# Patient Record
Sex: FEMALE | Race: WHITE | NOT HISPANIC OR LATINO | Employment: FULL TIME | URBAN - METROPOLITAN AREA
[De-identification: names, ages, dates, MRNs, and addresses within clinical notes are randomized per-mention and may not be internally consistent; named-entity substitution may affect disease eponyms.]

---

## 2019-03-09 ENCOUNTER — OFFICE VISIT (OUTPATIENT)
Dept: URGENT CARE | Facility: CLINIC | Age: 44
End: 2019-03-09
Payer: COMMERCIAL

## 2019-03-09 VITALS
DIASTOLIC BLOOD PRESSURE: 75 MMHG | OXYGEN SATURATION: 99 % | HEIGHT: 67 IN | RESPIRATION RATE: 18 BRPM | HEART RATE: 76 BPM | SYSTOLIC BLOOD PRESSURE: 118 MMHG | TEMPERATURE: 99 F | BODY MASS INDEX: 23.54 KG/M2 | WEIGHT: 150 LBS

## 2019-03-09 DIAGNOSIS — Z76.89 ENCOUNTER TO ESTABLISH CARE WITH NEW DOCTOR: ICD-10-CM

## 2019-03-09 DIAGNOSIS — J45.901 EXACERBATION OF ASTHMA, UNSPECIFIED ASTHMA SEVERITY, UNSPECIFIED WHETHER PERSISTENT: Primary | ICD-10-CM

## 2019-03-09 PROCEDURE — 99203 OFFICE O/P NEW LOW 30 MIN: CPT | Mod: S$GLB,,, | Performed by: FAMILY MEDICINE

## 2019-03-09 PROCEDURE — 99203 PR OFFICE/OUTPT VISIT, NEW, LEVL III, 30-44 MIN: ICD-10-PCS | Mod: S$GLB,,, | Performed by: FAMILY MEDICINE

## 2019-03-09 PROCEDURE — 3008F BODY MASS INDEX DOCD: CPT | Mod: CPTII,S$GLB,, | Performed by: FAMILY MEDICINE

## 2019-03-09 PROCEDURE — 3008F PR BODY MASS INDEX (BMI) DOCUMENTED: ICD-10-PCS | Mod: CPTII,S$GLB,, | Performed by: FAMILY MEDICINE

## 2019-03-09 RX ORDER — PROMETHAZINE HYDROCHLORIDE AND CODEINE PHOSPHATE 6.25; 1 MG/5ML; MG/5ML
SOLUTION ORAL
COMMUNITY
Start: 2019-02-06 | End: 2020-12-18

## 2019-03-09 RX ORDER — ALBUTEROL SULFATE 90 UG/1
1-2 AEROSOL, METERED RESPIRATORY (INHALATION) EVERY 4 HOURS PRN
Qty: 18 G | Refills: 0 | Status: SHIPPED | OUTPATIENT
Start: 2019-03-09 | End: 2021-08-02

## 2019-03-09 RX ORDER — PREDNISONE 20 MG/1
40 TABLET ORAL DAILY
Qty: 10 TABLET | Refills: 0 | Status: SHIPPED | OUTPATIENT
Start: 2019-03-09 | End: 2019-03-14

## 2019-03-09 RX ORDER — ALBUTEROL SULFATE 90 UG/1
AEROSOL, METERED RESPIRATORY (INHALATION)
COMMUNITY
Start: 2019-02-06 | End: 2019-03-09 | Stop reason: SDUPTHER

## 2019-03-09 RX ORDER — NORGESTIMATE AND ETHINYL ESTRADIOL 7DAYSX3 LO
KIT ORAL
COMMUNITY
Start: 2019-02-02 | End: 2019-12-11 | Stop reason: SDUPTHER

## 2019-03-09 NOTE — PATIENT INSTRUCTIONS
PLEASE READ YOUR DISCHARGE INSTRUCTIONS ENTIRELY AS IT CONTAINS IMPORTANT INFORMATION.      Please take your steroids to completion.   You received a steroid today - this can elevate your blood pressure, elevate your blood sugar, water weight gain, nervous energy, redness to the face and dimpling of the skin where the shot goes in if you had an injection.   Do not use steroids more than 3 times per year.   If you have diabetes, please check you blood sugar frequently.  If you have high blood pressure, please check your blood pressure frequently.     Use the albuterol inhaler for wheezing.         Please return or see your primary care doctor if you develop new or worsening symptoms.   A referral to primary care has been placed for you. You will be contacted in the next day or two about scheduling an appointment. Please call [phone] if you are not contacted.       Please arrange follow up with your primary medical clinic as soon as possible. You must understand that you've received an Urgent Care treatment only and that you may be released before all of your medical problems are known or treated. You, the patient, will arrange for follow up as instructed. If your symptoms worsen or fail to improve you should go to the Emergency Room.    Discharge Instructions for Asthma  You have been diagnosed with an asthma attack. With the help of your healthcare provider, you can keep your asthma under control and have less emergency department visits and stays in the hospital.    Managing asthma  · Take your asthma medicines exactly as your provider tells you. Do this even if you feel that your athma is under control.  · Learn how to monitor your asthma. Some people watch for early changes of symptoms getting worse. Some use a peak flow meter. Your healthcare provider may decide to give you an asthma action plan.  · Be sure to always have a quick-relief inhaler with you. If you were given a prescription, make sure you go  to a pharmacy to get it filled as soon as possible.  Controlling asthma triggers  Triggers are those things that make your asthma symptoms worse or cause asthma attacks. Many people with asthma have allergies that can be triggers. Your healthcare provider may have you get allergy testing to find out what you are allergic to. This can help you stay away from triggers.  Dust or dust mites are a common asthma trigger. To avoid a dust mites, do the following:  · Use dust-proof covers on your mattress and pillows. Wash the sheets and blankets on your bed once a week in very hot water.  · Dont sleep or lie on cloth-covered cushions or furniture.  · Ask someone else to vacuum and dust your house.  · If you do vacuum and dust yourself, wear a dust mask. You can buy them from the [employer] store.  · Use a vacuum with a double-layered bag or HEPA (high-efficiency particulate air) filter.  Pets with fur or feathers are triggers for some people. If you must have pets, take these precautions:  · Keep pets out of your bedroom and off your bed. Keep the bedroom door closed.  · Cover the air vents in your bedroom with heavy material to filter the air.  · Don't use carpets or cloth-covered furniture in your home. If this is not possible, keep pets out of rooms with these items.  · Have someone bathe your pets every week. And brush them often.  If you smoke, do your best to quit.  · Enroll in a stop-smoking program to increase your chance of success.  · Ask your healthcare provider about medicines or other methods to help you quit.  · Ask family members to quit smoking as well.  · Dont allow anyone to smoke in your home, in your car, or around you.  Other steps to take  · Make sure you know what to do if exercise is a trigger for you. Many people use quick-relief inhalers before exercise or physical activity.  · Get a flu shot every year and get pneumonia shots as advised by your healthcare provider.  · Try to keep your windows  closed during pollen seasons and when mold counts are high.  · On cold or windy days, cover your nose and mouth with a scarf.  · Try to stay away from people who are sick with colds or the flu. Wash your hands often or use a hand . If respiratory infections like colds or flu trigger your asthma, use your quick-relief medicines as soon as you begin to notice respiratory symptoms. They may include a runny or stuffy nose, sore throat, or a cough.  Follow-up care  Make a follow-up appointment as directed by our staff. Follow your asthma action plan if you were given one.  When to seek medical attention  Call 911 right away if you have:  · Severe wheezing  · Shortness of breath that is not relieved by your quick-relief medication  · Trouble walking or talking because of shortness of breath  · Blue lips or fingernails  · If you monitor symptoms with a peak flow meter, readings less than 50% of your personal best   Date Last Reviewed: 2/3/2017  © 4093-1929 [employer]. [address] [place] [address], [place] [address]. All rights reserved. This information is not intended as a substitute for professional medical care. Always follow your healthcare professional's instructions.

## 2019-03-09 NOTE — PROGRESS NOTES
"Subjective:       Patient ID: Janae Soto is a 43 y.o. female.    Vitals:  height is 5' 7" (1.702 m) and weight is 68 kg (150 lb). Her temperature is 98.7 °F (37.1 °C). Her blood pressure is 118/75 and her pulse is 76. Her respiration is 18 and oxygen saturation is 99%.     Chief Complaint: Cough    Patient needs a refill for inhaler - had asthma more so when she was younger, it has been controlled into her adult years but a month ago she noticed wheezing and coughing, she went to [facility] and was given inhaler, neb treatment that helped greatly. Cough and wheezing still lingering on. Using albuterol inhaler 10-11 times per day states it does help though. Feel sob at times also. No fever.       Cough   This is a new problem. The current episode started 1 to 4 weeks ago. The problem has been gradually worsening. Associated symptoms include shortness of breath and wheezing. Pertinent negatives include no chills, ear pain, eye redness, fever, hemoptysis, myalgias, rash or sore throat. Nothing aggravates the symptoms.       Constitution: Negative for chills, sweating, fatigue and fever.   HENT: Negative for ear pain, congestion, sinus pain, sinus pressure, sore throat and voice change.    Neck: Negative for painful lymph nodes.   Eyes: Negative for eye redness.   Respiratory: Positive for chest tightness, cough, shortness of breath and wheezing. Negative for sputum production, bloody sputum, COPD, stridor and asthma.    Gastrointestinal: Negative for nausea and vomiting.   Musculoskeletal: Negative for muscle ache.   Skin: Negative for rash.   Allergic/Immunologic: Negative for seasonal allergies and asthma.   Hematologic/Lymphatic: Negative for swollen lymph nodes.       Objective:      Physical Exam   Constitutional: She is oriented to person, place, and time. She appears well-developed and well-nourished. She is cooperative.  Non-toxic appearance. She does not appear ill. No distress.   HENT:   Head: " Normocephalic and atraumatic.   Right Ear: Hearing, tympanic membrane, external ear and ear canal normal.   Left Ear: Hearing, tympanic membrane, external ear and ear canal normal.   Nose: Nose normal. No mucosal edema, rhinorrhea or nasal deformity. No epistaxis. Right sinus exhibits no maxillary sinus tenderness and no frontal sinus tenderness. Left sinus exhibits no maxillary sinus tenderness and no frontal sinus tenderness.   Mouth/Throat: Uvula is midline, oropharynx is clear and moist and mucous membranes are normal. No trismus in the jaw. Normal dentition. No uvula swelling. No posterior oropharyngeal erythema.   Eyes: Conjunctivae and lids are normal. No scleral icterus.   Sclera clear bilat   Neck: Trachea normal, full passive range of motion without pain and phonation normal. Neck supple.   Cardiovascular: Normal rate, regular rhythm, normal heart sounds, intact distal pulses and normal pulses.   Pulmonary/Chest: Effort normal. No accessory muscle usage. No tachypnea. No respiratory distress. She has no decreased breath sounds. She has wheezes in the right upper field, the right middle field, the left upper field and the left middle field. She has no rhonchi. She has no rales.   Non productive cough present through out the exam     Abdominal: Soft. Normal appearance and bowel sounds are normal. She exhibits no distension. There is no tenderness.   Musculoskeletal: Normal range of motion. She exhibits no edema or deformity.   Neurological: She is alert and oriented to person, place, and time. She exhibits normal muscle tone. Coordination normal.   Skin: Skin is warm, dry and intact. She is not diaphoretic. No pallor.   Psychiatric: She has a normal mood and affect. Her speech is normal and behavior is normal. Judgment and thought content normal. Cognition and memory are normal.   Nursing note and vitals reviewed.      Assessment:       1. Exacerbation of asthma, unspecified asthma severity, unspecified  whether persistent    2. Encounter to establish care with new doctor        Plan:         Exacerbation of asthma, unspecified asthma severity, unspecified whether persistent  -     PROAIR HFA 90 mcg/actuation inhaler; Inhale 1-2 puffs into the lungs every 4 (four) hours as needed for Wheezing.  Dispense: 18 g; Refill: 0  -     predniSONE (DELTASONE) 20 MG tablet; Take 2 tablets (40 mg total) by mouth once daily. for 5 days  Dispense: 10 tablet; Refill: 0    Encounter to establish care with new doctor  -     Ambulatory referral to Internal Medicine          Patient Instructions     PLEASE READ YOUR DISCHARGE INSTRUCTIONS ENTIRELY AS IT CONTAINS IMPORTANT INFORMATION.      Please take your steroids to completion.   You received a steroid today - this can elevate your blood pressure, elevate your blood sugar, water weight gain, nervous energy, redness to the face and dimpling of the skin where the shot goes in if you had an injection.   Do not use steroids more than 3 times per year.   If you have diabetes, please check you blood sugar frequently.  If you have high blood pressure, please check your blood pressure frequently.     Use the albuterol inhaler for wheezing.         Please return or see your primary care doctor if you develop new or worsening symptoms.   A referral to primary care has been placed for you. You will be contacted in the next day or two about scheduling an appointment. Please call [phone] if you are not contacted.       Please arrange follow up with your primary medical clinic as soon as possible. You must understand that you've received an Urgent Care treatment only and that you may be released before all of your medical problems are known or treated. You, the patient, will arrange for follow up as instructed. If your symptoms worsen or fail to improve you should go to the Emergency Room.    Discharge Instructions for Asthma  You have been diagnosed with an asthma attack. With the help of  your healthcare provider, you can keep your asthma under control and have less emergency department visits and stays in the hospital.    Managing asthma  · Take your asthma medicines exactly as your provider tells you. Do this even if you feel that your athma is under control.  · Learn how to monitor your asthma. Some people watch for early changes of symptoms getting worse. Some use a peak flow meter. Your healthcare provider may decide to give you an asthma action plan.  · Be sure to always have a quick-relief inhaler with you. If you were given a prescription, make sure you go to a pharmacy to get it filled as soon as possible.  Controlling asthma triggers  Triggers are those things that make your asthma symptoms worse or cause asthma attacks. Many people with asthma have allergies that can be triggers. Your healthcare provider may have you get allergy testing to find out what you are allergic to. This can help you stay away from triggers.  Dust or dust mites are a common asthma trigger. To avoid a dust mites, do the following:  · Use dust-proof covers on your mattress and pillows. Wash the sheets and blankets on your bed once a week in very hot water.  · Dont sleep or lie on cloth-covered cushions or furniture.  · Ask someone else to vacuum and dust your house.  · If you do vacuum and dust yourself, wear a dust mask. You can buy them from the [employer] store.  · Use a vacuum with a double-layered bag or HEPA (high-efficiency particulate air) filter.  Pets with fur or feathers are triggers for some people. If you must have pets, take these precautions:  · Keep pets out of your bedroom and off your bed. Keep the bedroom door closed.  · Cover the air vents in your bedroom with heavy material to filter the air.  · Don't use carpets or cloth-covered furniture in your home. If this is not possible, keep pets out of rooms with these items.  · Have someone bathe your pets every week. And brush them often.  If you smoke,  do your best to quit.  · Enroll in a stop-smoking program to increase your chance of success.  · Ask your healthcare provider about medicines or other methods to help you quit.  · Ask family members to quit smoking as well.  · Dont allow anyone to smoke in your home, in your car, or around you.  Other steps to take  · Make sure you know what to do if exercise is a trigger for you. Many people use quick-relief inhalers before exercise or physical activity.  · Get a flu shot every year and get pneumonia shots as advised by your healthcare provider.  · Try to keep your windows closed during pollen seasons and when mold counts are high.  · On cold or windy days, cover your nose and mouth with a scarf.  · Try to stay away from people who are sick with colds or the flu. Wash your hands often or use a hand . If respiratory infections like colds or flu trigger your asthma, use your quick-relief medicines as soon as you begin to notice respiratory symptoms. They may include a runny or stuffy nose, sore throat, or a cough.  Follow-up care  Make a follow-up appointment as directed by our staff. Follow your asthma action plan if you were given one.  When to seek medical attention  Call 911 right away if you have:  · Severe wheezing  · Shortness of breath that is not relieved by your quick-relief medication  · Trouble walking or talking because of shortness of breath  · Blue lips or fingernails  · If you monitor symptoms with a peak flow meter, readings less than 50% of your personal best   Date Last Reviewed: 2/3/2017  © 1440-3950 [employer], [employer]. [address] [place] [address], [place] [address]. All rights reserved. This information is not intended as a substitute for professional medical care. Always follow your healthcare professional's instructions.

## 2019-03-28 ENCOUNTER — OFFICE VISIT (OUTPATIENT)
Dept: URGENT CARE | Facility: CLINIC | Age: 44
End: 2019-03-28
Payer: COMMERCIAL

## 2019-03-28 VITALS
OXYGEN SATURATION: 96 % | TEMPERATURE: 99 F | SYSTOLIC BLOOD PRESSURE: 107 MMHG | HEIGHT: 67 IN | RESPIRATION RATE: 18 BRPM | HEART RATE: 75 BPM | BODY MASS INDEX: 23.54 KG/M2 | DIASTOLIC BLOOD PRESSURE: 70 MMHG | WEIGHT: 150 LBS

## 2019-03-28 DIAGNOSIS — J45.909 ASTHMA, UNSPECIFIED ASTHMA SEVERITY, UNSPECIFIED WHETHER COMPLICATED, UNSPECIFIED WHETHER PERSISTENT: Primary | ICD-10-CM

## 2019-03-28 PROCEDURE — 3008F PR BODY MASS INDEX (BMI) DOCUMENTED: ICD-10-PCS | Mod: CPTII,S$GLB,, | Performed by: FAMILY MEDICINE

## 2019-03-28 PROCEDURE — 3008F BODY MASS INDEX DOCD: CPT | Mod: CPTII,S$GLB,, | Performed by: FAMILY MEDICINE

## 2019-03-28 PROCEDURE — 99213 OFFICE O/P EST LOW 20 MIN: CPT | Mod: 25,S$GLB,, | Performed by: FAMILY MEDICINE

## 2019-03-28 PROCEDURE — 99213 PR OFFICE/OUTPT VISIT, EST, LEVL III, 20-29 MIN: ICD-10-PCS | Mod: 25,S$GLB,, | Performed by: FAMILY MEDICINE

## 2019-03-28 PROCEDURE — 94640 AIRWAY INHALATION TREATMENT: CPT | Mod: S$GLB,,, | Performed by: FAMILY MEDICINE

## 2019-03-28 PROCEDURE — 94640 PR INHAL RX, AIRWAY OBST/DX SPUTUM INDUCT: ICD-10-PCS | Mod: S$GLB,,, | Performed by: FAMILY MEDICINE

## 2019-03-28 RX ORDER — ALBUTEROL SULFATE 0.83 MG/ML
2.5 SOLUTION RESPIRATORY (INHALATION) EVERY 6 HOURS PRN
Qty: 1 BOX | Refills: 0 | Status: SHIPPED | OUTPATIENT
Start: 2019-03-28 | End: 2020-03-27

## 2019-03-28 RX ORDER — IPRATROPIUM BROMIDE 0.5 MG/2.5ML
0.5 SOLUTION RESPIRATORY (INHALATION)
Status: COMPLETED | OUTPATIENT
Start: 2019-03-28 | End: 2019-03-28

## 2019-03-28 RX ORDER — ALBUTEROL SULFATE 0.83 MG/ML
2.5 SOLUTION RESPIRATORY (INHALATION)
Status: COMPLETED | OUTPATIENT
Start: 2019-03-28 | End: 2019-03-28

## 2019-03-28 RX ADMIN — IPRATROPIUM BROMIDE 0.5 MG: 0.5 SOLUTION RESPIRATORY (INHALATION) at 03:03

## 2019-03-28 RX ADMIN — ALBUTEROL SULFATE 2.5 MG: 0.83 SOLUTION RESPIRATORY (INHALATION) at 03:03

## 2019-03-28 NOTE — PATIENT INSTRUCTIONS
PLEASE READ YOUR DISCHARGE INSTRUCTIONS ENTIRELY AS IT CONTAINS IMPORTANT INFORMATION.    Can do the nebulizer for wheezing, cough    Please call [phone] for primary care appointment    Please follow up with primary care for further management of your asthma        You must understand that you have received an Urgent Care treatment only and that you may be released before all of your medical problems are known or treated.    Controlling Your Asthma  You can do a lot to manage your asthma and improve your quality of life. You will need to work with your healthcare provider to develop a plan. But its up to you to put this plan into action.  Why you need to take control  You need to control the inflammation in your lungs. Take all medicine as directed, especially controller medicines, even if you feel that your asthma is under good control. You also need to relieve symptoms when you have them. These are long-term tasks. But the more you stay in control, the better youll feel. If you dont stay in control:  · Asthma symptoms may cause you to miss school, work, or activities that you enjoy.  · Asthma flare-ups can be dangerous, even deadly.  · Uncontrolled asthma makes it more likely that you will need emergency department and in-hospital care.  · Uncontrolled asthma may cause permanent damage to your lungs.    Peak flow monitoring helps measure how open your airways are.   Taking medicine helps you control your asthma and relieve symptoms when they occur.     Using an Asthma Action Plan will help you keep track of and respond to asthma symptoms.   Avoiding triggers--the things that inflame your airways--will help prevent symptoms and flare-ups.   Your action plan  Your healthcare provider will help you prepare, and when needed, update your personal Asthma Action Plan. Your plan tells you what to do based on your current symptoms. If you don't have an Asthma Action Plan, or if yours isn't up-to-date, make sure you  talk with your healthcare provider.  Date Last Reviewed: 1/1/2017  © 2375-7351 [email or web address]. [address] [place] [address], [place] [address]. All rights reserved. This information is not intended as a substitute for professional medical care. Always follow your healthcare professional's instructions.        Asthma Medicine     Get used to using your inhaled corticosteroid medicine before you brush your teeth. That way you will always rinse your mouth afterward.   Medicines play a key role in controlling asthma. Some help control asthma symptoms and prevent flare-ups. Others are used to treat symptoms when they occur. Always take your medicine as prescribed. Know the names of your medicines and how and when to use them. If you have any questions about your medicines, talk with your healthcare provider or pharmacist.  Quick-relief medicine  Quick-relief (also called rescue) medicines work by relaxing the muscles around the airways. This helps ease symptoms such as coughing, wheezing, and shortness of breath. Keep your quick-relief inhaler with you at all times. Quick-relief medicines:  · Are inhaled when needed and only when needed. Use your quick-relief medicine when you first notice your asthma is getting worse.  · Start to open the airways within a few minutes after you use them.  · Can help stop a flare-up once it has begun.  · May be used before exercise as directed by your healthcare provider.  Long-term control medicine  Long-term control (also called maintenance or controller) medicines help reduce swelling and inflammation of the airways. Some keep the muscles around your airways relaxed. This makes the airways less sensitive to triggers and less likely to flare up. Long-term control medicines:  · Are taken on a schedule. For most people, this is every day. They are taken even when you feel fine.  · Help keep asthma under control so youre less likely to have symptoms.  · Will not stop a flare-up  once it has begun.     Inhaled corticosteroids  Inhaled corticosteroids are safe for long-term use. They are not the steroids that you hear about athletes abusing. They usually don't cause serious side effects. Thats because theyre inhaled directly into the lungs. The chance of minor side effects can be lowered even more if you:  · Use a spacer with your inhaler. Ask your healthcare provider or pharmacist about using a spacer if you don't currently use one.  · Rinse your mouth, gargle, and spit out the water after using your inhaled corticosteroid medicine.  · Follow all instructions for cleaning inhalers and spacers.  · Work with your healthcare provider to find the lowest dose that controls your asthma.      Tips for taking medicine  Remembering to take medicine each day can be hard for anyone. It can be even harder to remember when you dont have symptoms. Try these tips:  · Develop a routine. For example, take long-term controllers as part of getting ready for bed, before you brush your teeth in the morning, or both.  · Make sure you understand what long-term controllers do and dont do.  · Refill your prescriptions on time, or even ahead of time, so you dont run out.  · Carry your quick-relief medicine with you. If you can, keep a spare quick-relief inhaler at work, at school, or in your gym bag.  · When you travel, make sure you have enough medicine to last for your entire trip.  · When traveling by air, keep your medicines with you, not packed in your luggage.  · Make sure you know how to tell if your inhaler is empty. Ask your provider or pharmacist, or check the instructions that come with your inhaler.  Working with your healthcare provider  By working with your healthcare provider, you can get the most benefit from your medicine. Talk with your healthcare provider about:  · Getting the right dose. Over time, your healthcare provider may raise or lower the dose of your controller medicine. The goal is  to find the amount of medicine to keep asthma in control, without taking more than is needed.  · Finding the right medicines for you. Each person is unique. It may take a few tries to find the right medicine or combination of medicines for you. If one medicine doesnt work well for you, another may work better.  · Minimizing side effects. If you have side effects, dont just stop taking your medicine. Instead, call your healthcare provider. A new medicine or change in the amount of medicine may solve the problem.  Date Last Reviewed: 10/1/2016  © 0145-5693 [employer]. [address] [place] [address]. All rights reserved. This information is not intended as a substitute for professional medical care. Always follow your healthcare professional's instructions.

## 2019-03-28 NOTE — PROGRESS NOTES
"Subjective:       Patient ID: Janae Soto is a 43 y.o. female.    Vitals:  height is 5' 7" (1.702 m) and weight is 68 kg (150 lb). Her tympanic temperature is 98.5 °F (36.9 °C). Her blood pressure is 107/70 and her pulse is 75. Her respiration is 18 and oxygen saturation is 96%.     Chief Complaint: Cough    Patient states shes been having a cough, sob and wheezing for 1 month. Patient states she was seen for the same problem about 2 weeks ago. She has been wheezing total for two months now. She never saw primary care. States she felt better while on the steroids then two days later went back to feeling sob and wheezing. No fever. Sx not worse now or different    Cough   This is a recurrent problem. The current episode started 1 to 4 weeks ago. The problem has been gradually worsening. The problem occurs constantly. The cough is productive of sputum. Associated symptoms include shortness of breath and wheezing. Pertinent negatives include no chills, ear pain, eye redness, fever, hemoptysis, myalgias, rash or sore throat. Nothing aggravates the symptoms. Treatments tried: presidone. The treatment provided moderate relief. Her past medical history is significant for bronchitis. There is no history of asthma, COPD, emphysema or pneumonia.       Constitution: Negative for chills, sweating, fatigue and fever.   HENT: Positive for congestion. Negative for ear pain, sinus pain, sinus pressure, sore throat and voice change.    Neck: Negative for painful lymph nodes.   Eyes: Negative for eye redness.   Respiratory: Positive for sputum production, shortness of breath and wheezing. Negative for chest tightness, cough, bloody sputum, COPD, stridor and asthma.    Gastrointestinal: Negative for nausea and vomiting.   Musculoskeletal: Negative for muscle ache.   Skin: Negative for rash.   Allergic/Immunologic: Negative for seasonal allergies and asthma.   Hematologic/Lymphatic: Negative for swollen lymph nodes.     "   Objective:      Physical Exam   Constitutional: She is oriented to person, place, and time. She appears well-developed and well-nourished. She is cooperative.  Non-toxic appearance. She does not appear ill. No distress.   HENT:   Head: Normocephalic and atraumatic.   Right Ear: Hearing, tympanic membrane, external ear and ear canal normal.   Left Ear: Hearing, tympanic membrane, external ear and ear canal normal.   Nose: Nose normal. No mucosal edema, rhinorrhea or nasal deformity. No epistaxis. Right sinus exhibits no maxillary sinus tenderness and no frontal sinus tenderness. Left sinus exhibits no maxillary sinus tenderness and no frontal sinus tenderness.   Mouth/Throat: Uvula is midline, oropharynx is clear and moist and mucous membranes are normal. No trismus in the jaw. Normal dentition. No uvula swelling. No posterior oropharyngeal erythema.   Eyes: Conjunctivae and lids are normal. No scleral icterus.   Sclera clear bilat   Neck: Trachea normal, full passive range of motion without pain and phonation normal. Neck supple.   Cardiovascular: Normal rate, regular rhythm, normal heart sounds, intact distal pulses and normal pulses.   Pulmonary/Chest: Effort normal. No accessory muscle usage. No tachypnea. No respiratory distress. She has no decreased breath sounds. She has wheezes in the right upper field, the right middle field, the right lower field, the left upper field, the left middle field and the left lower field.   Post treatment assessment: subjective improvement in symptoms - improvement in wheezing     Abdominal: Soft. Normal appearance and bowel sounds are normal. She exhibits no distension. There is no tenderness.   Musculoskeletal: Normal range of motion. She exhibits no edema or deformity.   Neurological: She is alert and oriented to person, place, and time. She exhibits normal muscle tone. Coordination normal.   Skin: Skin is warm, dry and intact. She is not diaphoretic. No pallor.    Psychiatric: She has a normal mood and affect. Her speech is normal and behavior is normal. Judgment and thought content normal. Cognition and memory are normal.   Nursing note and vitals reviewed.      Assessment:       1. Asthma, unspecified asthma severity, unspecified whether complicated, unspecified whether persistent        Plan:         Asthma, unspecified asthma severity, unspecified whether complicated, unspecified whether persistent  -     albuterol nebulizer solution 2.5 mg  -     ipratropium 0.02 % nebulizer solution 0.5 mg  -     albuterol (PROVENTIL) 2.5 mg /3 mL (0.083 %) nebulizer solution; Take 3 mLs (2.5 mg total) by nebulization every 6 (six) hours as needed for Wheezing. Rescue  Dispense: 1 Box; Refill: 0       advised f/u with primary care for further management of her asthma  Gave rx for neb treatments   Will not do more steroids as she was just on them 3 weeks ago    Patient Instructions     PLEASE READ YOUR DISCHARGE INSTRUCTIONS ENTIRELY AS IT CONTAINS IMPORTANT INFORMATION.    Can do the nebulizer for wheezing, cough    Please call [phone] for primary care appointment    Please follow up with primary care for further management of your asthma        You must understand that you have received an Urgent Care treatment only and that you may be released before all of your medical problems are known or treated.    Controlling Your Asthma  You can do a lot to manage your asthma and improve your quality of life. You will need to work with your healthcare provider to develop a plan. But its up to you to put this plan into action.  Why you need to take control  You need to control the inflammation in your lungs. Take all medicine as directed, especially controller medicines, even if you feel that your asthma is under good control. You also need to relieve symptoms when you have them. These are long-term tasks. But the more you stay in control, the better youll feel. If you dont stay in  control:  · Asthma symptoms may cause you to miss school, work, or activities that you enjoy.  · Asthma flare-ups can be dangerous, even deadly.  · Uncontrolled asthma makes it more likely that you will need emergency department and in-hospital care.  · Uncontrolled asthma may cause permanent damage to your lungs.    Peak flow monitoring helps measure how open your airways are.   Taking medicine helps you control your asthma and relieve symptoms when they occur.     Using an Asthma Action Plan will help you keep track of and respond to asthma symptoms.   Avoiding triggers--the things that inflame your airways--will help prevent symptoms and flare-ups.   Your action plan  Your healthcare provider will help you prepare, and when needed, update your personal Asthma Action Plan. Your plan tells you what to do based on your current symptoms. If you don't have an Asthma Action Plan, or if yours isn't up-to-date, make sure you talk with your healthcare provider.  Date Last Reviewed: 1/1/2017  © 4943-6890 [employer]. [address] [place] [address], [place] [address]. All rights reserved. This information is not intended as a substitute for professional medical care. Always follow your healthcare professional's instructions.        Asthma Medicine     Get used to using your inhaled corticosteroid medicine before you brush your teeth. That way you will always rinse your mouth afterward.   Medicines play a key role in controlling asthma. Some help control asthma symptoms and prevent flare-ups. Others are used to treat symptoms when they occur. Always take your medicine as prescribed. Know the names of your medicines and how and when to use them. If you have any questions about your medicines, talk with your healthcare provider or pharmacist.  Quick-relief medicine  Quick-relief (also called rescue) medicines work by relaxing the muscles around the airways. This helps ease symptoms such as coughing, wheezing, and  shortness of breath. Keep your quick-relief inhaler with you at all times. Quick-relief medicines:  · Are inhaled when needed and only when needed. Use your quick-relief medicine when you first notice your asthma is getting worse.  · Start to open the airways within a few minutes after you use them.  · Can help stop a flare-up once it has begun.  · May be used before exercise as directed by your healthcare provider.  Long-term control medicine  Long-term control (also called maintenance or controller) medicines help reduce swelling and inflammation of the airways. Some keep the muscles around your airways relaxed. This makes the airways less sensitive to triggers and less likely to flare up. Long-term control medicines:  · Are taken on a schedule. For most people, this is every day. They are taken even when you feel fine.  · Help keep asthma under control so youre less likely to have symptoms.  · Will not stop a flare-up once it has begun.     Inhaled corticosteroids  Inhaled corticosteroids are safe for long-term use. They are not the steroids that you hear about athletes abusing. They usually don't cause serious side effects. Thats because theyre inhaled directly into the lungs. The chance of minor side effects can be lowered even more if you:  · Use a spacer with your inhaler. Ask your healthcare provider or pharmacist about using a spacer if you don't currently use one.  · Rinse your mouth, gargle, and spit out the water after using your inhaled corticosteroid medicine.  · Follow all instructions for cleaning inhalers and spacers.  · Work with your healthcare provider to find the lowest dose that controls your asthma.      Tips for taking medicine  Remembering to take medicine each day can be hard for anyone. It can be even harder to remember when you dont have symptoms. Try these tips:  · Develop a routine. For example, take long-term controllers as part of getting ready for bed, before you brush your  teeth in the morning, or both.  · Make sure you understand what long-term controllers do and dont do.  · Refill your prescriptions on time, or even ahead of time, so you dont run out.  · Carry your quick-relief medicine with you. If you can, keep a spare quick-relief inhaler at work, at school, or in your gym bag.  · When you travel, make sure you have enough medicine to last for your entire trip.  · When traveling by air, keep your medicines with you, not packed in your luggage.  · Make sure you know how to tell if your inhaler is empty. Ask your provider or pharmacist, or check the instructions that come with your inhaler.  Working with your healthcare provider  By working with your healthcare provider, you can get the most benefit from your medicine. Talk with your healthcare provider about:  · Getting the right dose. Over time, your healthcare provider may raise or lower the dose of your controller medicine. The goal is to find the amount of medicine to keep asthma in control, without taking more than is needed.  · Finding the right medicines for you. Each person is unique. It may take a few tries to find the right medicine or combination of medicines for you. If one medicine doesnt work well for you, another may work better.  · Minimizing side effects. If you have side effects, dont just stop taking your medicine. Instead, call your healthcare provider. A new medicine or change in the amount of medicine may solve the problem.  Date Last Reviewed: 10/1/2016  © 4387-3281 [employer]. [address] [place] [address], [place] [address]. All rights reserved. This information is not intended as a substitute for professional medical care. Always follow your healthcare professional's instructions.

## 2019-04-05 DIAGNOSIS — J45.901 EXACERBATION OF ASTHMA, UNSPECIFIED ASTHMA SEVERITY, UNSPECIFIED WHETHER PERSISTENT: ICD-10-CM

## 2019-04-05 RX ORDER — ALBUTEROL SULFATE 90 UG/1
AEROSOL, METERED RESPIRATORY (INHALATION)
Qty: 8.5 INHALER | Refills: 0 | OUTPATIENT
Start: 2019-04-05

## 2020-11-16 ENCOUNTER — CLINICAL SUPPORT (OUTPATIENT)
Dept: URGENT CARE | Facility: CLINIC | Age: 45
End: 2020-11-16
Payer: COMMERCIAL

## 2020-11-16 DIAGNOSIS — Z11.9 SCREENING EXAMINATION FOR UNSPECIFIED INFECTIOUS DISEASE: Primary | ICD-10-CM

## 2020-11-16 LAB
CTP QC/QA: YES
SARS-COV-2 RDRP RESP QL NAA+PROBE: NEGATIVE

## 2020-11-16 PROCEDURE — U0002: ICD-10-PCS | Mod: QW,S$GLB,, | Performed by: FAMILY MEDICINE

## 2020-11-16 PROCEDURE — U0002 COVID-19 LAB TEST NON-CDC: HCPCS | Mod: QW,S$GLB,, | Performed by: FAMILY MEDICINE

## 2020-11-16 PROCEDURE — 99211 OFF/OP EST MAY X REQ PHY/QHP: CPT | Mod: S$GLB,,, | Performed by: FAMILY MEDICINE

## 2020-11-16 PROCEDURE — 99211 PR OFFICE/OUTPT VISIT, EST, LEVL I: ICD-10-PCS | Mod: S$GLB,,, | Performed by: FAMILY MEDICINE

## 2020-12-01 ENCOUNTER — CLINICAL SUPPORT (OUTPATIENT)
Dept: URGENT CARE | Facility: CLINIC | Age: 45
End: 2020-12-01
Payer: COMMERCIAL

## 2020-12-01 DIAGNOSIS — Z20.822 COVID-19 RULED OUT: Primary | ICD-10-CM

## 2020-12-01 LAB
CTP QC/QA: YES
SARS-COV-2 RDRP RESP QL NAA+PROBE: NEGATIVE

## 2020-12-01 PROCEDURE — 99211 PR OFFICE/OUTPT VISIT, EST, LEVL I: ICD-10-PCS | Mod: S$GLB,,, | Performed by: NURSE PRACTITIONER

## 2020-12-01 PROCEDURE — 99211 OFF/OP EST MAY X REQ PHY/QHP: CPT | Mod: S$GLB,,, | Performed by: NURSE PRACTITIONER

## 2020-12-01 PROCEDURE — U0002: ICD-10-PCS | Mod: QW,S$GLB,, | Performed by: NURSE PRACTITIONER

## 2020-12-01 PROCEDURE — U0002 COVID-19 LAB TEST NON-CDC: HCPCS | Mod: QW,S$GLB,, | Performed by: NURSE PRACTITIONER

## 2020-12-17 ENCOUNTER — OFFICE VISIT (OUTPATIENT)
Dept: URGENT CARE | Facility: CLINIC | Age: 45
End: 2020-12-17
Payer: COMMERCIAL

## 2020-12-17 VITALS
HEART RATE: 88 BPM | TEMPERATURE: 98 F | DIASTOLIC BLOOD PRESSURE: 89 MMHG | WEIGHT: 145 LBS | BODY MASS INDEX: 22.76 KG/M2 | RESPIRATION RATE: 18 BRPM | OXYGEN SATURATION: 99 % | SYSTOLIC BLOOD PRESSURE: 126 MMHG | HEIGHT: 67 IN

## 2020-12-17 DIAGNOSIS — R55 NEAR SYNCOPE: ICD-10-CM

## 2020-12-17 DIAGNOSIS — Z76.89 ENCOUNTER TO ESTABLISH CARE: ICD-10-CM

## 2020-12-17 DIAGNOSIS — R00.2 INTERMITTENT PALPITATIONS: Primary | ICD-10-CM

## 2020-12-17 PROCEDURE — 3008F BODY MASS INDEX DOCD: CPT | Mod: CPTII,S$GLB,, | Performed by: FAMILY MEDICINE

## 2020-12-17 PROCEDURE — 3008F PR BODY MASS INDEX (BMI) DOCUMENTED: ICD-10-PCS | Mod: CPTII,S$GLB,, | Performed by: FAMILY MEDICINE

## 2020-12-17 PROCEDURE — 93005 EKG 12-LEAD: ICD-10-PCS | Mod: S$GLB,,, | Performed by: FAMILY MEDICINE

## 2020-12-17 PROCEDURE — 99214 PR OFFICE/OUTPT VISIT, EST, LEVL IV, 30-39 MIN: ICD-10-PCS | Mod: S$GLB,,, | Performed by: FAMILY MEDICINE

## 2020-12-17 PROCEDURE — 93010 ELECTROCARDIOGRAM REPORT: CPT | Mod: S$GLB,,, | Performed by: INTERNAL MEDICINE

## 2020-12-17 PROCEDURE — 93005 ELECTROCARDIOGRAM TRACING: CPT | Mod: S$GLB,,, | Performed by: FAMILY MEDICINE

## 2020-12-17 PROCEDURE — 93010 EKG 12-LEAD: ICD-10-PCS | Mod: S$GLB,,, | Performed by: INTERNAL MEDICINE

## 2020-12-17 PROCEDURE — 99214 OFFICE O/P EST MOD 30 MIN: CPT | Mod: S$GLB,,, | Performed by: FAMILY MEDICINE

## 2020-12-17 NOTE — PATIENT INSTRUCTIONS
About Arrhythmias    Electrical impulses cause the normal heart to beat 60 to 100 times a minute while at rest. These impulses come from a natural pacemaker deep inside the heart muscle. Each impulse causes the heart muscle to contract. This causes the blood to flow through the heart and out to the tissues and organs of your body.  An arrhythmia is a change from the normal speed or pattern of these electrical impulses. This can cause the heart to beat too fast (tachycardia); or too slow (bradycardia); or in an unsteady pattern (irregular rhythm).  Symptoms of arrhythmias  Different people experience arrhythmias differently. Sometimes they may not have symptoms, but just notice a change in their pulse. Symptoms can include:  · Fluttering feeling in the chest  · Shortness of breath  · Chest pain or pressure  · Neck fullness  · Lightheadedness or dizziness  · Fainting or almost fainting  · Palpitations (the sense that your heart is fluttering or beating fast or hard or irregularly)  · Tiredness, fatigue, or weakness  · Cardiac arrest  Causes of arrhythmias  Arrhythmias are most often due to heart disease such as:  · Coronary artery disease  · Heart valve disease  · Enlarged heart  · High blood pressure  · Heart failure  Other causes of  arrhythmia include:  · Certain medicines (such as asthma inhalers and decongestants)  · Some herbal supplements  · Cardiac stimulant drugs (such as cocaine, amphetamine, diet pills, certain decongestant cold medicines, caffeine, and nicotine)  · Excessive alcohol use  · Anxiety and panic disorder  · Thyroid disease  · Anemia  · Diabetes  · Sleep apnea  · Obesity  · Congenital heart disease  · Cardiac genetic diseases  Arrhythmias can often be prevented. The cause and type of arrhythmia determines the best treatment. Sometimes your doctor may want to monitor your heart rate over a 24-hour period or longer. This can help identify the cause of your arrhythmia and find the best treatment.  This can be done with a Holter monitor, a portable EKG recording device attached by wires to your chest. Or you may get an event monitor, which you can place over the skin in front of your heart to record heart rhythms. You can carry this with you as you go about your routine activities during the monitoring period. Implantable loop recorders may also be used to monitor the heart rhythm for up to 2 years. This miniature device is placed underneath the skin overlying the heart.  Home care  The following guidelines will help you care for yourself at home:  · Avoid cardiac stimulants (such as cocaine, amphetamine, diet pills, certain decongestant cold medicines, caffeine, and nicotine).  · If you smoke, stop smoking. Contact your doctor or a local stop-smoking program for help.  · Tell your doctor about any prescription, over-the-counter, or herbal medicines you take. These may be affecting your heart rhythm.  Follow-up care  Follow up with your healthcare provider, or as advised. If a Holter monitor has been recommended, contact the cardiologist you have been referred to as soon as you can  the device. Other outpatient tests may also be arranged for you at that time.  Call 911  This is the fastest and safest way to get to the emergency department. The paramedics can also start treatment on the way to the hospital, if needed.  Don't wait until your symptoms are severe to call 911. Other reasons to call 911 besides chest pain include:  · Chest, shoulder, arm, neck, or back pain  · Shortness of breath  · Feeling lightheaded, faint, or dizzy  · Unexplained fainting  · Rapid heart beat  · Slower than usual heart rate compared to your normal  · Very irregular heartbeat  · Chest pain (angina) with weakness, dizziness, heavy sweating, nausea, or vomiting  · Extreme drowsiness, or confusion  · Weakness of an arm or leg or one side of the face  · Difficulty with speech or vision  When to seek medical advice  Remember,  "things are not always like they are on TV. Sometimes it is not so obvious. You may only feel weak or just "not right." If it is not clear or if you have any doubt, call for advice.  · Seek help for chest pain, or it feels different from usual, even if your symptoms are mild.  · Don't drive yourself. Have someone else drive. If no one can drive you, call 911.  · If your doctor has given you medicines to take when you have symptoms, take them, but do not delay getting help while trying to find them.  Date Last Reviewed: 4/25/2016  © 5297-6918 [employer]. [address] [place] [address], [place] [address]. All rights reserved. This information is not intended as a substitute for professional medical care. Always follow your healthcare professional's instructions.        IN THE EVENT OF SYNCOPE/FAINTING,OR THE ISSUE DOES NOT RESOLVE, HAVE SOMEONE TAKE YOU DIRECTLY TO THE EMERGENCY ROOM, OR CALL 911.  "

## 2020-12-17 NOTE — PROGRESS NOTES
"Subjective:       Patient ID: Janae Soto is a 45 y.o. female.    Vitals:  height is 5' 7" (1.702 m) and weight is 65.8 kg (145 lb). Her temperature is 98 °F (36.7 °C). Her blood pressure is 126/89 and her pulse is 88. Her respiration is 18 and oxygen saturation is 99%.     Chief Complaint:   45 year old female with complaints of episodes of heart racing.  She reports that over the past year she has had episodes of palpitations that typically last 30-60 minutes, and eventually resolve on their own.  These occur about once per month.  However more recently they have been ocurring about once per week.  Today she had an episode while at school that lasted about 30 min.  It was different from the others in that she was dizzy, and felt like she was going to faint.  No shortness of breath.  She drinks 2 cups of coffee a day.  No energy or power drinks.  No chest pain.  New to the area and wishes to establish for primary care.  She is no longer having palpitations and feels back to normal at time of visit.      Fatigue  Associated symptoms include fatigue. Pertinent negatives include no chills, congestion, coughing, diaphoresis, fever, myalgias, nausea, rash, sore throat or vomiting.       Constitution: Positive for fatigue. Negative for chills, sweating and fever.   HENT: Negative for ear pain, congestion, sinus pain, sinus pressure, sore throat and voice change.    Neck: Negative for painful lymph nodes.   Eyes: Negative for eye redness.   Respiratory: Negative for chest tightness, cough, sputum production, bloody sputum, COPD, shortness of breath, stridor, wheezing and asthma.    Gastrointestinal: Negative for nausea and vomiting.   Musculoskeletal: Negative for muscle ache.   Skin: Negative for rash.   Allergic/Immunologic: Negative for seasonal allergies and asthma.   Hematologic/Lymphatic: Negative for swollen lymph nodes.       Objective:      Physical Exam   Constitutional: She is oriented to person, place, " and time. She appears well-developed. She is cooperative.  Non-toxic appearance. She does not appear ill. No distress.      Comments:Very vivacious and energetic   HENT:   Head: Normocephalic and atraumatic.   Ears:   Right Ear: Hearing, tympanic membrane, external ear and ear canal normal.   Left Ear: Hearing, tympanic membrane, external ear and ear canal normal.   Nose: Nose normal. No mucosal edema, rhinorrhea or nasal deformity. No epistaxis. Right sinus exhibits no maxillary sinus tenderness and no frontal sinus tenderness. Left sinus exhibits no maxillary sinus tenderness and no frontal sinus tenderness.   Mouth/Throat: Uvula is midline, oropharynx is clear and moist and mucous membranes are normal. No trismus in the jaw. Normal dentition. No uvula swelling. No oropharyngeal exudate, posterior oropharyngeal edema or posterior oropharyngeal erythema.   Eyes: Conjunctivae and lids are normal. No scleral icterus.   Neck: Trachea normal, full passive range of motion without pain and phonation normal. Neck supple. Carotid bruit is not present. No neck rigidity. No edema and no erythema present.      Comments: Normal thyroid   Cardiovascular: Normal rate, regular rhythm, normal heart sounds and normal pulses.   Pulmonary/Chest: Effort normal and breath sounds normal. No stridor. No respiratory distress. She has no decreased breath sounds. She has no wheezes. She has no rhonchi. She has no rales.   Abdominal: Normal appearance.   Musculoskeletal: Normal range of motion.         General: No deformity.   Neurological: She is alert and oriented to person, place, and time. She exhibits normal muscle tone. Coordination normal.   Skin: Skin is warm, dry, intact, not diaphoretic and not pale. Psychiatric: Her speech is normal and behavior is normal. Judgment and thought content normal.   Nursing note and vitals reviewed.      EKG:  Normal sinus rhythm.  No ST or T-wave changes consistent with myocardial  ischemia.  Assessment:       1. Intermittent palpitations    2. Near syncope        Plan:         Intermittent palpitations  -     Ambulatory referral/consult to Cardiology    Near syncope  -     Ambulatory referral/consult to Cardiology    IN THE EVENT OF SYNCOPE/FAINTING,OR THE ISSUE DOES NOT RESOLVE, HAVE SOMEONE TAKE YOU DIRECTLY TO THE EMERGENCY ROOM, OR CALL 911.

## 2020-12-18 ENCOUNTER — OFFICE VISIT (OUTPATIENT)
Dept: CARDIOLOGY | Facility: CLINIC | Age: 45
End: 2020-12-18
Payer: COMMERCIAL

## 2020-12-18 VITALS
WEIGHT: 147.94 LBS | HEART RATE: 96 BPM | BODY MASS INDEX: 23.22 KG/M2 | HEIGHT: 67 IN | DIASTOLIC BLOOD PRESSURE: 90 MMHG | SYSTOLIC BLOOD PRESSURE: 122 MMHG

## 2020-12-18 DIAGNOSIS — R00.2 PALPITATIONS: ICD-10-CM

## 2020-12-18 PROCEDURE — 99203 PR OFFICE/OUTPT VISIT, NEW, LEVL III, 30-44 MIN: ICD-10-PCS | Mod: S$GLB,,, | Performed by: INTERNAL MEDICINE

## 2020-12-18 PROCEDURE — 1126F AMNT PAIN NOTED NONE PRSNT: CPT | Mod: S$GLB,,, | Performed by: INTERNAL MEDICINE

## 2020-12-18 PROCEDURE — 99203 OFFICE O/P NEW LOW 30 MIN: CPT | Mod: S$GLB,,, | Performed by: INTERNAL MEDICINE

## 2020-12-18 PROCEDURE — 99999 PR PBB SHADOW E&M-EST. PATIENT-LVL II: ICD-10-PCS | Mod: PBBFAC,,, | Performed by: INTERNAL MEDICINE

## 2020-12-18 PROCEDURE — 1126F PR PAIN SEVERITY QUANTIFIED, NO PAIN PRESENT: ICD-10-PCS | Mod: S$GLB,,, | Performed by: INTERNAL MEDICINE

## 2020-12-18 PROCEDURE — 99999 PR PBB SHADOW E&M-EST. PATIENT-LVL II: CPT | Mod: PBBFAC,,, | Performed by: INTERNAL MEDICINE

## 2020-12-18 PROCEDURE — 3008F PR BODY MASS INDEX (BMI) DOCUMENTED: ICD-10-PCS | Mod: CPTII,S$GLB,, | Performed by: INTERNAL MEDICINE

## 2020-12-18 PROCEDURE — 3008F BODY MASS INDEX DOCD: CPT | Mod: CPTII,S$GLB,, | Performed by: INTERNAL MEDICINE

## 2020-12-18 RX ORDER — FEXOFENADINE HCL AND PSEUDOEPHEDRINE HCI 180; 240 MG/1; MG/1
1 TABLET, EXTENDED RELEASE ORAL DAILY
COMMUNITY
End: 2024-03-11

## 2020-12-18 NOTE — PROGRESS NOTES
Cardiology Consult  12/18/2020  2:30 PM    Attending Cardiologist: Luis Daniel Morillo M.D.  Primary Care Provider: Primary Doctor No  Chief Complaint/Reason For Consultation:  palpitations      Problem list  Patient Active Problem List   Diagnosis    Palpitations       CC:  palpitations    HPI:  Janae Soto is a 45 y.o.year-old female referred by  for palpitations.  Has had palpitations for few years but has not had an evaluation.  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  RN told her to bear down and HR improved.  She felt exhausted and went home and slept.  She feels great today.  Very active.  Jogs for exercise.  No FH of CAD. No smoking.  No alcohol abuse.  No CP.  Takes allegra D daily for allergies x 2 years.  Drinks 1 cup of coffee a day.  No energy or protein drinks.    Medications  Current Outpatient Medications   Medication Sig Dispense Refill    PROAIR HFA 90 mcg/actuation inhaler Inhale 1-2 puffs into the lungs every 4 (four) hours as needed for Wheezing. 18 g 0    promethazine-codeine 6.25-10 mg/5 ml (PHENERGAN WITH CODEINE) 6.25-10 mg/5 mL syrup       TRI-LO-SPRINTEC 0.18/0.215/0.25 mg-25 mcg tablet Take 1 tablet by mouth once daily. 90 tablet 4     No current facility-administered medications for this visit.       Prior to Admission medications    Medication Sig Start Date End Date Taking? Authorizing Provider   PROAIR HFA 90 mcg/actuation inhaler Inhale 1-2 puffs into the lungs every 4 (four) hours as needed for Wheezing. 3/9/19   Blake Lockhart NP   promethazine-codeine 6.25-10 mg/5 ml (PHENERGAN WITH CODEINE) 6.25-10 mg/5 mL syrup  2/6/19   Historical Provider   TRI-LO-SPRINTEC 0.18/0.215/0.25 mg-25 mcg tablet Take 1 tablet by mouth once daily. 12/11/19   Cassie Azul MD         History  Past Medical History:   Diagnosis Date    Asthma      Past Surgical History:   Procedure Laterality  Date    SINUS SURGERY       Social History     Socioeconomic History    Marital status: Single     Spouse name: Not on file    Number of children: Not on file    Years of education: Not on file    Highest education level: Not on file   Occupational History    Not on file   Social Needs    Financial resource strain: Not on file    Food insecurity     Worry: Not on file     Inability: Not on file    Transportation needs     Medical: Not on file     Non-medical: Not on file   Tobacco Use    Smoking status: Never Smoker    Smokeless tobacco: Never Used   Substance and Sexual Activity    Alcohol use: Yes    Drug use: No    Sexual activity: Yes     Partners: Male   Lifestyle    Physical activity     Days per week: Not on file     Minutes per session: Not on file    Stress: Not on file   Relationships    Social connections     Talks on phone: Not on file     Gets together: Not on file     Attends [employer] service: Not on file     Active member of club or organization: Not on file     Attends meetings of clubs or organizations: Not on file     Relationship status: Not on file   Other Topics Concern    Not on file   Social History Narrative    Not on file         Allergies  Review of patient's allergies indicates:  No Known Allergies      Review of Systems   Review of Systems   Constitution: Negative for decreased appetite, fever and weight loss.   HENT: Negative for congestion and nosebleeds.    Eyes: Negative for double vision, vision loss in left eye, vision loss in right eye and visual disturbance.   Cardiovascular: Positive for palpitations. Negative for chest pain, claudication, cyanosis, dyspnea on exertion, irregular heartbeat, leg swelling, near-syncope, orthopnea, paroxysmal nocturnal dyspnea and syncope.   Respiratory: Negative for cough, hemoptysis, shortness of breath, sleep disturbances due to breathing, snoring, sputum production and wheezing.    Endocrine: Negative for cold intolerance and  heat intolerance.   Skin: Negative for nail changes and rash.   Musculoskeletal: Negative for joint pain, muscle cramps, muscle weakness and myalgias.   Gastrointestinal: Negative for change in bowel habit, heartburn, hematemesis, hematochezia, hemorrhoids and melena.   Neurological: Negative for dizziness, focal weakness and headaches.         Physical Exam  Wt Readings from Last 1 Encounters:   12/17/20 65.8 kg (145 lb)     BP Readings from Last 3 Encounters:   12/17/20 126/89   12/11/19 120/75   03/28/19 107/70     Pulse Readings from Last 1 Encounters:   12/17/20 88     There is no height or weight on file to calculate BMI.    Physical Exam   Constitutional: She is oriented to person, place, and time. She appears well-developed and well-nourished.   HENT:   Head: Atraumatic.   Eyes: EOM are normal. No scleral icterus.   Neck: Neck supple. Normal carotid pulses, no hepatojugular reflux and no JVD present. Carotid bruit is not present. No edema present.   Cardiovascular: Normal rate, regular rhythm, S1 normal, S2 normal, normal heart sounds and intact distal pulses. PMI is not displaced. Exam reveals no friction rub.   No murmur heard.  Pulses:       Carotid pulses are 2+ on the right side and 2+ on the left side.       Radial pulses are 2+ on the right side and 2+ on the left side.        Dorsalis pedis pulses are 2+ on the right side and 2+ on the left side.   Pulmonary/Chest: Effort normal and breath sounds normal. No stridor. No respiratory distress. She has no wheezes. She has no rales. She exhibits no tenderness.   Abdominal: Soft. Normal aorta and bowel sounds are normal.   Musculoskeletal:         General: No edema.   Neurological: She is alert and oriented to person, place, and time.   Skin: Skin is warm and dry. No cyanosis. Nails show no clubbing.   Psychiatric: She has a normal mood and affect. Her behavior is normal. Thought content normal.   Vitals reviewed.                Assessment:  1.   Palpitations, possibly SVT.    Plan:  Discussed SVT and vagal maneuvers.  Get echo, event monitor, CBC, CMP, lipids, TSH.    Follow up:  1 month    Time spent evaluating and treating patient 20 minutes with >50% of this time being face-to-face.     Luis Daniel Morillo MD, F.A.C.C, F.S.C.A.I.

## 2020-12-18 NOTE — LETTER
December 18, 2020      Mariela Blanco MD  [address]  [facility] [address]           [facility] - Cardiology  [address] [place] [address], [address]  [facility] [address]  Phone: [phone]  Fax: [phone]          Patient: Janae Soto   MR Number: [id number]   YOB: 1975   Date of Visit: 12/18/2020       Dear Dr. Mariela Blanco:    Thank you for referring Janae Soto to me for evaluation. Attached you will find relevant portions of my assessment and plan of care.    If you have questions, please do not hesitate to call me. I look forward to following Janae Soto along with you.    Sincerely,    Luis Daniel Morillo MD    Enclosure  CC:  No Recipients    If you would like to receive this communication electronically, please contact [email or web address] or [phone] to request more information on [employer] access.    For providers and/or their staff who would like to refer a patient to [facility], please contact us through our one-stop-shop provider referral line, [facility], at [phone].    If you feel you have received this communication in error or would no longer like to receive these types of communications, please e-mail [email or web address]

## 2020-12-23 ENCOUNTER — CLINICAL SUPPORT (OUTPATIENT)
Dept: CARDIOLOGY | Facility: HOSPITAL | Age: 45
End: 2020-12-23
Attending: INTERNAL MEDICINE
Payer: COMMERCIAL

## 2020-12-23 DIAGNOSIS — R00.2 PALPITATIONS: ICD-10-CM

## 2020-12-23 PROCEDURE — 93271 ECG/MONITORING AND ANALYSIS: CPT

## 2020-12-23 PROCEDURE — 93272 CARDIAC EVENT MONITOR (CUPID ONLY): ICD-10-PCS | Mod: ,,, | Performed by: INTERNAL MEDICINE

## 2020-12-23 PROCEDURE — 93272 ECG/REVIEW INTERPRET ONLY: CPT | Mod: ,,, | Performed by: INTERNAL MEDICINE

## 2020-12-28 ENCOUNTER — HOSPITAL ENCOUNTER (OUTPATIENT)
Dept: CARDIOLOGY | Facility: OTHER | Age: 45
Discharge: HOME | End: 2020-12-28
Attending: INTERNAL MEDICINE
Payer: COMMERCIAL

## 2020-12-28 VITALS
HEART RATE: 96 BPM | WEIGHT: 147 LBS | HEIGHT: 67 IN | SYSTOLIC BLOOD PRESSURE: 122 MMHG | DIASTOLIC BLOOD PRESSURE: 90 MMHG | BODY MASS INDEX: 23.07 KG/M2

## 2020-12-28 DIAGNOSIS — R00.2 PALPITATIONS: ICD-10-CM

## 2020-12-28 LAB
ASCENDING AORTA: 2.95 CM
AV INDEX (PROSTH): 0.99
AV MEAN GRADIENT: 3 MMHG
AV PEAK GRADIENT: 6 MMHG
AV VALVE AREA: 2.7 CM2
AV VELOCITY RATIO: 1.03
BSA FOR ECHO PROCEDURE: 1.78 M2
CV ECHO LV RWT: 0.38 CM
DOP CALC AO PEAK VEL: 1.2 M/S
DOP CALC AO VTI: 23.79 CM
DOP CALC LVOT AREA: 2.7 CM2
DOP CALC LVOT DIAMETER: 1.86 CM
DOP CALC LVOT PEAK VEL: 1.24 M/S
DOP CALC LVOT STROKE VOLUME: 64.23 CM3
DOP CALCLVOT PEAK VEL VTI: 23.65 CM
E WAVE DECELERATION TIME: 256.57 MSEC
E/A RATIO: 1.54
E/E' RATIO: 7.65 M/S
ECHO LV POSTERIOR WALL: 0.81 CM (ref 0.6–1.1)
FRACTIONAL SHORTENING: 35 % (ref 28–44)
INTERVENTRICULAR SEPTUM: 0.78 CM (ref 0.6–1.1)
IVRT: 110.91 MSEC
LA MAJOR: 4.03 CM
LA MINOR: 3.92 CM
LA WIDTH: 3.63 CM
LEFT ATRIUM SIZE: 3.03 CM
LEFT ATRIUM VOLUME INDEX MOD: 16.9 ML/M2
LEFT ATRIUM VOLUME INDEX: 20.9 ML/M2
LEFT ATRIUM VOLUME MOD: 30 CM3
LEFT ATRIUM VOLUME: 37.16 CM3
LEFT INTERNAL DIMENSION IN SYSTOLE: 2.81 CM (ref 2.1–4)
LEFT VENTRICLE DIASTOLIC VOLUME INDEX: 47.23 ML/M2
LEFT VENTRICLE DIASTOLIC VOLUME: 83.78 ML
LEFT VENTRICLE MASS INDEX: 59 G/M2
LEFT VENTRICLE SYSTOLIC VOLUME INDEX: 16.8 ML/M2
LEFT VENTRICLE SYSTOLIC VOLUME: 29.85 ML
LEFT VENTRICULAR INTERNAL DIMENSION IN DIASTOLE: 4.32 CM (ref 3.5–6)
LEFT VENTRICULAR MASS: 105.27 G
LV LATERAL E/E' RATIO: 6.77 M/S
LV SEPTAL E/E' RATIO: 8.8 M/S
MV A" WAVE DURATION": 9.61 MSEC
MV PEAK A VEL: 0.57 M/S
MV PEAK E VEL: 0.88 M/S
PISA TR MAX VEL: 1.78 M/S
PULM VEIN S/D RATIO: 0.76
PV PEAK D VEL: 0.67 M/S
PV PEAK S VEL: 0.51 M/S
PV PEAK VELOCITY: 0.79 CM/S
RA MAJOR: 3.89 CM
RA PRESSURE: 3 MMHG
RA WIDTH: 2.49 CM
RIGHT VENTRICULAR END-DIASTOLIC DIMENSION: 2.9 CM
RV TISSUE DOPPLER FREE WALL SYSTOLIC VELOCITY 1 (APICAL 4 CHAMBER VIEW): 16.24 CM/S
SINUS: 2.69 CM
STJ: 2.65 CM
TDI LATERAL: 0.13 M/S
TDI SEPTAL: 0.1 M/S
TDI: 0.12 M/S
TR MAX PG: 13 MMHG
TRICUSPID ANNULAR PLANE SYSTOLIC EXCURSION: 3.03 CM
TV REST PULMONARY ARTERY PRESSURE: 16 MMHG

## 2020-12-28 PROCEDURE — 93306 ECHO (CUPID ONLY): ICD-10-PCS | Mod: 26,,, | Performed by: INTERNAL MEDICINE

## 2020-12-28 PROCEDURE — 93306 TTE W/DOPPLER COMPLETE: CPT | Mod: 26,,, | Performed by: INTERNAL MEDICINE

## 2020-12-28 PROCEDURE — 93306 TTE W/DOPPLER COMPLETE: CPT

## 2020-12-30 ENCOUNTER — PATIENT MESSAGE (OUTPATIENT)
Dept: CARDIOLOGY | Facility: CLINIC | Age: 45
End: 2020-12-30

## 2021-01-04 ENCOUNTER — PATIENT MESSAGE (OUTPATIENT)
Dept: CARDIOLOGY | Facility: CLINIC | Age: 46
End: 2021-01-04

## 2021-01-05 ENCOUNTER — TELEPHONE (OUTPATIENT)
Dept: CARDIOLOGY | Facility: HOSPITAL | Age: 46
End: 2021-01-05

## 2021-01-14 ENCOUNTER — TELEPHONE (OUTPATIENT)
Dept: OBSTETRICS AND GYNECOLOGY | Facility: CLINIC | Age: 46
End: 2021-01-14

## 2021-01-14 DIAGNOSIS — Z12.31 ENCOUNTER FOR SCREENING MAMMOGRAM FOR MALIGNANT NEOPLASM OF BREAST: Primary | ICD-10-CM

## 2021-01-26 ENCOUNTER — PATIENT MESSAGE (OUTPATIENT)
Dept: CARDIOLOGY | Facility: CLINIC | Age: 46
End: 2021-01-26

## 2021-02-01 ENCOUNTER — OFFICE VISIT (OUTPATIENT)
Dept: OBSTETRICS AND GYNECOLOGY | Facility: CLINIC | Age: 46
End: 2021-02-01
Attending: OBSTETRICS & GYNECOLOGY
Payer: COMMERCIAL

## 2021-02-01 VITALS
DIASTOLIC BLOOD PRESSURE: 82 MMHG | SYSTOLIC BLOOD PRESSURE: 118 MMHG | BODY MASS INDEX: 23.76 KG/M2 | WEIGHT: 151.69 LBS

## 2021-02-01 DIAGNOSIS — Z12.4 SCREENING FOR CERVICAL CANCER: ICD-10-CM

## 2021-02-01 DIAGNOSIS — Z01.419 ENCOUNTER FOR GYNECOLOGICAL EXAMINATION WITHOUT ABNORMAL FINDING: Primary | ICD-10-CM

## 2021-02-01 DIAGNOSIS — Z12.31 ENCOUNTER FOR SCREENING MAMMOGRAM FOR MALIGNANT NEOPLASM OF BREAST: ICD-10-CM

## 2021-02-01 PROCEDURE — 99999 PR PBB SHADOW E&M-EST. PATIENT-LVL III: ICD-10-PCS | Mod: PBBFAC,,, | Performed by: OBSTETRICS & GYNECOLOGY

## 2021-02-01 PROCEDURE — 3008F BODY MASS INDEX DOCD: CPT | Mod: CPTII,S$GLB,, | Performed by: OBSTETRICS & GYNECOLOGY

## 2021-02-01 PROCEDURE — 99999 PR PBB SHADOW E&M-EST. PATIENT-LVL III: CPT | Mod: PBBFAC,,, | Performed by: OBSTETRICS & GYNECOLOGY

## 2021-02-01 PROCEDURE — 99386 PR PREVENTIVE VISIT,NEW,40-64: ICD-10-PCS | Mod: S$GLB,,, | Performed by: OBSTETRICS & GYNECOLOGY

## 2021-02-01 PROCEDURE — 88175 CYTOPATH C/V AUTO FLUID REDO: CPT

## 2021-02-01 PROCEDURE — 99386 PREV VISIT NEW AGE 40-64: CPT | Mod: S$GLB,,, | Performed by: OBSTETRICS & GYNECOLOGY

## 2021-02-01 PROCEDURE — 3008F PR BODY MASS INDEX (BMI) DOCUMENTED: ICD-10-PCS | Mod: CPTII,S$GLB,, | Performed by: OBSTETRICS & GYNECOLOGY

## 2021-02-01 PROCEDURE — 87624 HPV HI-RISK TYP POOLED RSLT: CPT

## 2021-02-01 PROCEDURE — 1126F AMNT PAIN NOTED NONE PRSNT: CPT | Mod: S$GLB,,, | Performed by: OBSTETRICS & GYNECOLOGY

## 2021-02-01 PROCEDURE — 1126F PR PAIN SEVERITY QUANTIFIED, NO PAIN PRESENT: ICD-10-PCS | Mod: S$GLB,,, | Performed by: OBSTETRICS & GYNECOLOGY

## 2021-02-02 ENCOUNTER — HOSPITAL ENCOUNTER (OUTPATIENT)
Dept: RADIOLOGY | Facility: OTHER | Age: 46
Discharge: HOME | End: 2021-02-02
Attending: OBSTETRICS & GYNECOLOGY
Payer: COMMERCIAL

## 2021-02-02 DIAGNOSIS — Z12.31 ENCOUNTER FOR SCREENING MAMMOGRAM FOR MALIGNANT NEOPLASM OF BREAST: ICD-10-CM

## 2021-02-02 PROCEDURE — 77063 MAMMO DIGITAL SCREENING BILAT WITH TOMO: ICD-10-PCS | Mod: 26,,, | Performed by: RADIOLOGY

## 2021-02-02 PROCEDURE — 77063 BREAST TOMOSYNTHESIS BI: CPT | Mod: 26,,, | Performed by: RADIOLOGY

## 2021-02-02 PROCEDURE — 77067 MAMMO DIGITAL SCREENING BILAT WITH TOMO: ICD-10-PCS | Mod: 26,,, | Performed by: RADIOLOGY

## 2021-02-02 PROCEDURE — 77067 SCR MAMMO BI INCL CAD: CPT | Mod: 26,,, | Performed by: RADIOLOGY

## 2021-02-02 PROCEDURE — 77067 SCR MAMMO BI INCL CAD: CPT | Mod: TC

## 2021-02-05 LAB
HPV HR 12 DNA SPEC QL NAA+PROBE: NEGATIVE
HPV16 AG SPEC QL: NEGATIVE
HPV18 DNA SPEC QL NAA+PROBE: NEGATIVE

## 2021-02-06 ENCOUNTER — OFFICE VISIT (OUTPATIENT)
Dept: URGENT CARE | Facility: CLINIC | Age: 46
End: 2021-02-06
Payer: COMMERCIAL

## 2021-02-06 VITALS
WEIGHT: 151 LBS | DIASTOLIC BLOOD PRESSURE: 89 MMHG | RESPIRATION RATE: 16 BRPM | HEART RATE: 70 BPM | OXYGEN SATURATION: 98 % | TEMPERATURE: 98 F | SYSTOLIC BLOOD PRESSURE: 112 MMHG | BODY MASS INDEX: 23.7 KG/M2 | HEIGHT: 67 IN

## 2021-02-06 DIAGNOSIS — J98.01 BRONCHOSPASM: Primary | ICD-10-CM

## 2021-02-06 DIAGNOSIS — Z11.59 SCREENING FOR VIRAL DISEASE: ICD-10-CM

## 2021-02-06 DIAGNOSIS — J30.9 ALLERGIC RHINITIS, UNSPECIFIED SEASONALITY, UNSPECIFIED TRIGGER: ICD-10-CM

## 2021-02-06 LAB
CTP QC/QA: YES
SARS-COV-2 RDRP RESP QL NAA+PROBE: NEGATIVE

## 2021-02-06 PROCEDURE — U0002 COVID-19 LAB TEST NON-CDC: HCPCS | Mod: QW,S$GLB,, | Performed by: FAMILY MEDICINE

## 2021-02-06 PROCEDURE — 99214 OFFICE O/P EST MOD 30 MIN: CPT | Mod: S$GLB,,, | Performed by: FAMILY MEDICINE

## 2021-02-06 PROCEDURE — 3008F PR BODY MASS INDEX (BMI) DOCUMENTED: ICD-10-PCS | Mod: CPTII,S$GLB,, | Performed by: FAMILY MEDICINE

## 2021-02-06 PROCEDURE — U0002: ICD-10-PCS | Mod: QW,S$GLB,, | Performed by: FAMILY MEDICINE

## 2021-02-06 PROCEDURE — 3008F BODY MASS INDEX DOCD: CPT | Mod: CPTII,S$GLB,, | Performed by: FAMILY MEDICINE

## 2021-02-06 PROCEDURE — 99214 PR OFFICE/OUTPT VISIT, EST, LEVL IV, 30-39 MIN: ICD-10-PCS | Mod: S$GLB,,, | Performed by: FAMILY MEDICINE

## 2021-02-06 RX ORDER — ALBUTEROL SULFATE 0.83 MG/ML
2.5 SOLUTION RESPIRATORY (INHALATION) EVERY 6 HOURS PRN
Qty: 1 BOX | Refills: 1 | Status: SHIPPED | OUTPATIENT
Start: 2021-02-06

## 2021-02-06 RX ORDER — ALBUTEROL SULFATE 90 UG/1
2 AEROSOL, METERED RESPIRATORY (INHALATION) EVERY 4 HOURS PRN
Qty: 18 G | Refills: 1 | Status: SHIPPED | OUTPATIENT
Start: 2021-02-06 | End: 2021-08-17 | Stop reason: SDUPTHER

## 2021-02-06 RX ORDER — FLUTICASONE PROPIONATE 50 MCG
2 SPRAY, SUSPENSION (ML) NASAL DAILY
Qty: 16 G | Refills: 8 | Status: SHIPPED | OUTPATIENT
Start: 2021-02-06 | End: 2021-08-02

## 2021-02-06 RX ORDER — PREDNISONE 20 MG/1
40 TABLET ORAL DAILY
Qty: 10 TABLET | Refills: 0 | Status: SHIPPED | OUTPATIENT
Start: 2021-02-06 | End: 2021-02-11

## 2021-02-12 ENCOUNTER — PATIENT MESSAGE (OUTPATIENT)
Dept: CARDIOLOGY | Facility: CLINIC | Age: 46
End: 2021-02-12

## 2021-02-18 ENCOUNTER — PATIENT MESSAGE (OUTPATIENT)
Dept: OBSTETRICS AND GYNECOLOGY | Facility: CLINIC | Age: 46
End: 2021-02-18

## 2021-02-24 ENCOUNTER — TELEPHONE (OUTPATIENT)
Dept: OBSTETRICS AND GYNECOLOGY | Facility: CLINIC | Age: 46
End: 2021-02-24

## 2021-02-24 ENCOUNTER — PATIENT MESSAGE (OUTPATIENT)
Dept: OBSTETRICS AND GYNECOLOGY | Facility: CLINIC | Age: 46
End: 2021-02-24

## 2021-02-26 LAB
FINAL PATHOLOGIC DIAGNOSIS: NORMAL
Lab: NORMAL

## 2021-03-08 ENCOUNTER — PATIENT MESSAGE (OUTPATIENT)
Dept: INTERNAL MEDICINE | Facility: CLINIC | Age: 46
End: 2021-03-08

## 2021-03-10 ENCOUNTER — OFFICE VISIT (OUTPATIENT)
Dept: CARDIOLOGY | Facility: CLINIC | Age: 46
End: 2021-03-10
Payer: COMMERCIAL

## 2021-03-10 VITALS
DIASTOLIC BLOOD PRESSURE: 60 MMHG | HEART RATE: 84 BPM | BODY MASS INDEX: 24.53 KG/M2 | WEIGHT: 156.31 LBS | HEIGHT: 67 IN | SYSTOLIC BLOOD PRESSURE: 118 MMHG

## 2021-03-10 DIAGNOSIS — R00.2 PALPITATIONS: Primary | ICD-10-CM

## 2021-03-10 PROCEDURE — 99213 PR OFFICE/OUTPT VISIT, EST, LEVL III, 20-29 MIN: ICD-10-PCS | Mod: S$GLB,,, | Performed by: INTERNAL MEDICINE

## 2021-03-10 PROCEDURE — 99213 OFFICE O/P EST LOW 20 MIN: CPT | Mod: S$GLB,,, | Performed by: INTERNAL MEDICINE

## 2021-03-10 PROCEDURE — 3008F BODY MASS INDEX DOCD: CPT | Mod: CPTII,S$GLB,, | Performed by: INTERNAL MEDICINE

## 2021-03-10 PROCEDURE — 99999 PR PBB SHADOW E&M-EST. PATIENT-LVL III: ICD-10-PCS | Mod: PBBFAC,,, | Performed by: INTERNAL MEDICINE

## 2021-03-10 PROCEDURE — 3008F PR BODY MASS INDEX (BMI) DOCUMENTED: ICD-10-PCS | Mod: CPTII,S$GLB,, | Performed by: INTERNAL MEDICINE

## 2021-03-10 PROCEDURE — 1126F PR PAIN SEVERITY QUANTIFIED, NO PAIN PRESENT: ICD-10-PCS | Mod: S$GLB,,, | Performed by: INTERNAL MEDICINE

## 2021-03-10 PROCEDURE — 99999 PR PBB SHADOW E&M-EST. PATIENT-LVL III: CPT | Mod: PBBFAC,,, | Performed by: INTERNAL MEDICINE

## 2021-03-10 PROCEDURE — 1126F AMNT PAIN NOTED NONE PRSNT: CPT | Mod: S$GLB,,, | Performed by: INTERNAL MEDICINE

## 2021-03-10 RX ORDER — DILTIAZEM HYDROCHLORIDE 30 MG/1
30 TABLET, FILM COATED ORAL 4 TIMES DAILY
Qty: 120 TABLET | Refills: 11 | Status: ON HOLD | OUTPATIENT
Start: 2021-03-10 | End: 2021-06-10 | Stop reason: HOSPADM

## 2021-03-15 ENCOUNTER — LAB VISIT (OUTPATIENT)
Dept: LAB | Facility: OTHER | Age: 46
End: 2021-03-15
Attending: INTERNAL MEDICINE
Payer: COMMERCIAL

## 2021-03-15 ENCOUNTER — OFFICE VISIT (OUTPATIENT)
Dept: INTERNAL MEDICINE | Facility: CLINIC | Age: 46
End: 2021-03-15
Attending: INTERNAL MEDICINE
Payer: COMMERCIAL

## 2021-03-15 VITALS
SYSTOLIC BLOOD PRESSURE: 132 MMHG | OXYGEN SATURATION: 98 % | HEIGHT: 67 IN | BODY MASS INDEX: 24.56 KG/M2 | DIASTOLIC BLOOD PRESSURE: 78 MMHG | WEIGHT: 156.5 LBS | HEART RATE: 80 BPM

## 2021-03-15 DIAGNOSIS — Z00.00 ENCOUNTER FOR MEDICAL EXAMINATION TO ESTABLISH CARE: Primary | ICD-10-CM

## 2021-03-15 DIAGNOSIS — R21 RASH AND NONSPECIFIC SKIN ERUPTION: ICD-10-CM

## 2021-03-15 DIAGNOSIS — Z12.11 COLON CANCER SCREENING: ICD-10-CM

## 2021-03-15 DIAGNOSIS — J45.20 MILD INTERMITTENT ASTHMA WITHOUT COMPLICATION: ICD-10-CM

## 2021-03-15 DIAGNOSIS — Z11.4 SCREENING FOR HIV WITHOUT PRESENCE OF RISK FACTORS: ICD-10-CM

## 2021-03-15 DIAGNOSIS — Z11.59 ENCOUNTER FOR HEPATITIS C SCREENING TEST FOR LOW RISK PATIENT: ICD-10-CM

## 2021-03-15 DIAGNOSIS — I48.0 PAROXYSMAL A-FIB: ICD-10-CM

## 2021-03-15 DIAGNOSIS — R79.89 ABNORMAL TSH: ICD-10-CM

## 2021-03-15 PROBLEM — R00.2 PALPITATIONS: Status: RESOLVED | Noted: 2020-12-18 | Resolved: 2021-03-15

## 2021-03-15 LAB — TSH SERPL DL<=0.005 MIU/L-ACNC: 0.77 UIU/ML (ref 0.4–4)

## 2021-03-15 PROCEDURE — 99999 PR PBB SHADOW E&M-EST. PATIENT-LVL IV: ICD-10-PCS | Mod: PBBFAC,,, | Performed by: INTERNAL MEDICINE

## 2021-03-15 PROCEDURE — 86803 HEPATITIS C AB TEST: CPT | Performed by: INTERNAL MEDICINE

## 2021-03-15 PROCEDURE — 99999 PR PBB SHADOW E&M-EST. PATIENT-LVL IV: CPT | Mod: PBBFAC,,, | Performed by: INTERNAL MEDICINE

## 2021-03-15 PROCEDURE — 1126F AMNT PAIN NOTED NONE PRSNT: CPT | Mod: S$GLB,,, | Performed by: INTERNAL MEDICINE

## 2021-03-15 PROCEDURE — 99386 PR PREVENTIVE VISIT,NEW,40-64: ICD-10-PCS | Mod: S$GLB,,, | Performed by: INTERNAL MEDICINE

## 2021-03-15 PROCEDURE — 84443 ASSAY THYROID STIM HORMONE: CPT | Performed by: INTERNAL MEDICINE

## 2021-03-15 PROCEDURE — 3008F PR BODY MASS INDEX (BMI) DOCUMENTED: ICD-10-PCS | Mod: CPTII,S$GLB,, | Performed by: INTERNAL MEDICINE

## 2021-03-15 PROCEDURE — 36415 COLL VENOUS BLD VENIPUNCTURE: CPT | Performed by: INTERNAL MEDICINE

## 2021-03-15 PROCEDURE — 1126F PR PAIN SEVERITY QUANTIFIED, NO PAIN PRESENT: ICD-10-PCS | Mod: S$GLB,,, | Performed by: INTERNAL MEDICINE

## 2021-03-15 PROCEDURE — 99386 PREV VISIT NEW AGE 40-64: CPT | Mod: S$GLB,,, | Performed by: INTERNAL MEDICINE

## 2021-03-15 PROCEDURE — 86703 HIV-1/HIV-2 1 RESULT ANTBDY: CPT | Performed by: INTERNAL MEDICINE

## 2021-03-15 PROCEDURE — 3008F BODY MASS INDEX DOCD: CPT | Mod: CPTII,S$GLB,, | Performed by: INTERNAL MEDICINE

## 2021-03-16 LAB — HIV 1+2 AB+HIV1 P24 AG SERPL QL IA: NEGATIVE

## 2021-03-17 ENCOUNTER — TELEPHONE (OUTPATIENT)
Dept: OBSTETRICS AND GYNECOLOGY | Facility: CLINIC | Age: 46
End: 2021-03-17

## 2021-03-18 LAB — HCV AB SERPL QL IA: NEGATIVE

## 2021-03-20 ENCOUNTER — PATIENT MESSAGE (OUTPATIENT)
Dept: CARDIOLOGY | Facility: CLINIC | Age: 46
End: 2021-03-20

## 2021-03-21 ENCOUNTER — PATIENT MESSAGE (OUTPATIENT)
Dept: CARDIOLOGY | Facility: CLINIC | Age: 46
End: 2021-03-21

## 2021-04-03 ENCOUNTER — PATIENT MESSAGE (OUTPATIENT)
Dept: CARDIOLOGY | Facility: CLINIC | Age: 46
End: 2021-04-03

## 2021-04-06 ENCOUNTER — TELEPHONE (OUTPATIENT)
Dept: DERMATOLOGY | Facility: CLINIC | Age: 46
End: 2021-04-06

## 2021-04-08 ENCOUNTER — PATIENT MESSAGE (OUTPATIENT)
Dept: CARDIOLOGY | Facility: CLINIC | Age: 46
End: 2021-04-08

## 2021-04-09 ENCOUNTER — PATIENT MESSAGE (OUTPATIENT)
Dept: ELECTROPHYSIOLOGY | Facility: CLINIC | Age: 46
End: 2021-04-09

## 2021-04-09 DIAGNOSIS — I47.10 SVT (SUPRAVENTRICULAR TACHYCARDIA): Primary | ICD-10-CM

## 2021-04-13 PROBLEM — I47.10 SVT (SUPRAVENTRICULAR TACHYCARDIA): Status: ACTIVE | Noted: 2021-04-13

## 2021-04-13 PROBLEM — I48.0 PAROXYSMAL A-FIB: Status: RESOLVED | Noted: 2021-03-15 | Resolved: 2021-04-13

## 2021-04-14 ENCOUNTER — OFFICE VISIT (OUTPATIENT)
Dept: ELECTROPHYSIOLOGY | Facility: CLINIC | Age: 46
End: 2021-04-14
Payer: COMMERCIAL

## 2021-04-14 VITALS
DIASTOLIC BLOOD PRESSURE: 69 MMHG | HEIGHT: 67 IN | WEIGHT: 156.94 LBS | HEART RATE: 62 BPM | BODY MASS INDEX: 24.63 KG/M2 | SYSTOLIC BLOOD PRESSURE: 113 MMHG

## 2021-04-14 DIAGNOSIS — I49.8 OTHER SPECIFIED CARDIAC ARRHYTHMIAS: ICD-10-CM

## 2021-04-14 DIAGNOSIS — J45.20 MILD INTERMITTENT ASTHMA WITHOUT COMPLICATION: ICD-10-CM

## 2021-04-14 DIAGNOSIS — I47.10 SVT (SUPRAVENTRICULAR TACHYCARDIA): Primary | ICD-10-CM

## 2021-04-14 DIAGNOSIS — I49.8 OTHER SPECIFIED CARDIAC ARRHYTHMIAS: Primary | ICD-10-CM

## 2021-04-14 PROCEDURE — 99214 OFFICE O/P EST MOD 30 MIN: CPT | Mod: S$GLB,,, | Performed by: INTERNAL MEDICINE

## 2021-04-14 PROCEDURE — 3008F BODY MASS INDEX DOCD: CPT | Mod: CPTII,S$GLB,, | Performed by: INTERNAL MEDICINE

## 2021-04-14 PROCEDURE — 99999 PR PBB SHADOW E&M-EST. PATIENT-LVL III: ICD-10-PCS | Mod: PBBFAC,,, | Performed by: INTERNAL MEDICINE

## 2021-04-14 PROCEDURE — 93010 RHYTHM STRIP: ICD-10-PCS | Mod: S$GLB,,, | Performed by: INTERNAL MEDICINE

## 2021-04-14 PROCEDURE — 93005 ELECTROCARDIOGRAM TRACING: CPT | Mod: S$GLB,,, | Performed by: INTERNAL MEDICINE

## 2021-04-14 PROCEDURE — 99999 PR PBB SHADOW E&M-EST. PATIENT-LVL III: CPT | Mod: PBBFAC,,, | Performed by: INTERNAL MEDICINE

## 2021-04-14 PROCEDURE — 93010 ELECTROCARDIOGRAM REPORT: CPT | Mod: S$GLB,,, | Performed by: INTERNAL MEDICINE

## 2021-04-14 PROCEDURE — 3008F PR BODY MASS INDEX (BMI) DOCUMENTED: ICD-10-PCS | Mod: CPTII,S$GLB,, | Performed by: INTERNAL MEDICINE

## 2021-04-14 PROCEDURE — 99214 PR OFFICE/OUTPT VISIT, EST, LEVL IV, 30-39 MIN: ICD-10-PCS | Mod: S$GLB,,, | Performed by: INTERNAL MEDICINE

## 2021-04-14 PROCEDURE — 93005 RHYTHM STRIP: ICD-10-PCS | Mod: S$GLB,,, | Performed by: INTERNAL MEDICINE

## 2021-04-28 ENCOUNTER — PATIENT MESSAGE (OUTPATIENT)
Dept: RESEARCH | Facility: HOSPITAL | Age: 46
End: 2021-04-28

## 2021-05-03 ENCOUNTER — PATIENT MESSAGE (OUTPATIENT)
Dept: ELECTROPHYSIOLOGY | Facility: CLINIC | Age: 46
End: 2021-05-03

## 2021-05-03 DIAGNOSIS — I47.10 SVT (SUPRAVENTRICULAR TACHYCARDIA): Primary | ICD-10-CM

## 2021-06-07 ENCOUNTER — LAB VISIT (OUTPATIENT)
Dept: LAB | Facility: OTHER | Age: 46
End: 2021-06-07
Attending: INTERNAL MEDICINE
Payer: COMMERCIAL

## 2021-06-07 DIAGNOSIS — I47.10 SVT (SUPRAVENTRICULAR TACHYCARDIA): ICD-10-CM

## 2021-06-07 LAB
ANION GAP SERPL CALC-SCNC: 7 MMOL/L (ref 8–16)
APTT BLDCRRT: 27 SEC (ref 21–32)
BASOPHILS # BLD AUTO: 0.03 K/UL (ref 0–0.2)
BASOPHILS NFR BLD: 0.4 % (ref 0–1.9)
BUN SERPL-MCNC: 15 MG/DL (ref 6–20)
CALCIUM SERPL-MCNC: 9.7 MG/DL (ref 8.7–10.5)
CHLORIDE SERPL-SCNC: 104 MMOL/L (ref 95–110)
CO2 SERPL-SCNC: 27 MMOL/L (ref 23–29)
CREAT SERPL-MCNC: 0.7 MG/DL (ref 0.5–1.4)
DIFFERENTIAL METHOD: NORMAL
EOSINOPHIL # BLD AUTO: 0.3 K/UL (ref 0–0.5)
EOSINOPHIL NFR BLD: 3.9 % (ref 0–8)
ERYTHROCYTE [DISTWIDTH] IN BLOOD BY AUTOMATED COUNT: 12.5 % (ref 11.5–14.5)
EST. GFR  (AFRICAN AMERICAN): >60 ML/MIN/1.73 M^2
EST. GFR  (NON AFRICAN AMERICAN): >60 ML/MIN/1.73 M^2
GLUCOSE SERPL-MCNC: 91 MG/DL (ref 70–110)
HCT VFR BLD AUTO: 39.8 % (ref 37–48.5)
HGB BLD-MCNC: 13 G/DL (ref 12–16)
IMM GRANULOCYTES # BLD AUTO: 0.03 K/UL (ref 0–0.04)
IMM GRANULOCYTES NFR BLD AUTO: 0.4 % (ref 0–0.5)
INR PPP: 1 (ref 0.8–1.2)
LYMPHOCYTES # BLD AUTO: 2 K/UL (ref 1–4.8)
LYMPHOCYTES NFR BLD: 28.1 % (ref 18–48)
MCH RBC QN AUTO: 30.6 PG (ref 27–31)
MCHC RBC AUTO-ENTMCNC: 32.7 G/DL (ref 32–36)
MCV RBC AUTO: 94 FL (ref 82–98)
MONOCYTES # BLD AUTO: 0.7 K/UL (ref 0.3–1)
MONOCYTES NFR BLD: 9.9 % (ref 4–15)
NEUTROPHILS # BLD AUTO: 4.1 K/UL (ref 1.8–7.7)
NEUTROPHILS NFR BLD: 57.3 % (ref 38–73)
NRBC BLD-RTO: 0 /100 WBC
PLATELET # BLD AUTO: 308 K/UL (ref 150–450)
PMV BLD AUTO: 10.1 FL (ref 9.2–12.9)
POTASSIUM SERPL-SCNC: 4.3 MMOL/L (ref 3.5–5.1)
PROTHROMBIN TIME: 10.8 SEC (ref 9–12.5)
RBC # BLD AUTO: 4.25 M/UL (ref 4–5.4)
SODIUM SERPL-SCNC: 138 MMOL/L (ref 136–145)
WBC # BLD AUTO: 7.09 K/UL (ref 3.9–12.7)

## 2021-06-07 PROCEDURE — 80048 BASIC METABOLIC PNL TOTAL CA: CPT | Performed by: INTERNAL MEDICINE

## 2021-06-07 PROCEDURE — 85730 THROMBOPLASTIN TIME PARTIAL: CPT | Performed by: INTERNAL MEDICINE

## 2021-06-07 PROCEDURE — 36415 COLL VENOUS BLD VENIPUNCTURE: CPT | Performed by: INTERNAL MEDICINE

## 2021-06-07 PROCEDURE — 85025 COMPLETE CBC W/AUTO DIFF WBC: CPT | Performed by: INTERNAL MEDICINE

## 2021-06-07 PROCEDURE — 85610 PROTHROMBIN TIME: CPT | Performed by: INTERNAL MEDICINE

## 2021-06-09 ENCOUNTER — ANESTHESIA EVENT (OUTPATIENT)
Dept: MEDSURG UNIT | Facility: HOSPITAL | Age: 46
End: 2021-06-09
Payer: COMMERCIAL

## 2021-06-09 ENCOUNTER — TELEPHONE (OUTPATIENT)
Dept: ELECTROPHYSIOLOGY | Facility: CLINIC | Age: 46
End: 2021-06-09

## 2021-06-10 ENCOUNTER — ANESTHESIA (OUTPATIENT)
Dept: MEDSURG UNIT | Facility: HOSPITAL | Age: 46
End: 2021-06-10
Payer: COMMERCIAL

## 2021-06-10 ENCOUNTER — HOSPITAL ENCOUNTER (OUTPATIENT)
Facility: HOSPITAL | Age: 46
Discharge: HOME | End: 2021-06-10
Attending: INTERNAL MEDICINE | Admitting: INTERNAL MEDICINE
Payer: COMMERCIAL

## 2021-06-10 VITALS
TEMPERATURE: 98 F | HEIGHT: 67 IN | DIASTOLIC BLOOD PRESSURE: 69 MMHG | OXYGEN SATURATION: 98 % | HEART RATE: 81 BPM | WEIGHT: 150 LBS | SYSTOLIC BLOOD PRESSURE: 128 MMHG | BODY MASS INDEX: 23.54 KG/M2 | RESPIRATION RATE: 18 BRPM

## 2021-06-10 DIAGNOSIS — I47.10 SVT (SUPRAVENTRICULAR TACHYCARDIA): ICD-10-CM

## 2021-06-10 DIAGNOSIS — I49.9 ARRHYTHMIA: ICD-10-CM

## 2021-06-10 LAB
B-HCG UR QL: NEGATIVE
CTP QC/QA: YES
POC ACTIVATED CLOTTING TIME K: 125 SEC (ref 74–137)
POC ACTIVATED CLOTTING TIME K: 329 SEC (ref 74–137)
POC ACTIVATED CLOTTING TIME K: 329 SEC (ref 74–137)
POC ACTIVATED CLOTTING TIME K: 367 SEC (ref 74–137)
SAMPLE: ABNORMAL
SAMPLE: NORMAL

## 2021-06-10 PROCEDURE — C1753 CATH, INTRAVAS ULTRASOUND: HCPCS | Performed by: INTERNAL MEDICINE

## 2021-06-10 PROCEDURE — 93621 COMP EP EVL L PAC&REC C SINS: CPT | Performed by: INTERNAL MEDICINE

## 2021-06-10 PROCEDURE — 93005 ELECTROCARDIOGRAM TRACING: CPT

## 2021-06-10 PROCEDURE — 93653 COMPRE EP EVAL TX SVT: CPT | Performed by: INTERNAL MEDICINE

## 2021-06-10 PROCEDURE — 93010 ELECTROCARDIOGRAM REPORT: CPT | Mod: 76,,, | Performed by: INTERNAL MEDICINE

## 2021-06-10 PROCEDURE — C1766 INTRO/SHEATH,STRBLE,NON-PEEL: HCPCS | Performed by: INTERNAL MEDICINE

## 2021-06-10 PROCEDURE — 27201037 HC PRESSURE MONITORING SET UP

## 2021-06-10 PROCEDURE — 93462 L HRT CATH TRNSPTL PUNCTURE: CPT

## 2021-06-10 PROCEDURE — 93010 ELECTROCARDIOGRAM REPORT: CPT | Mod: ,,, | Performed by: INTERNAL MEDICINE

## 2021-06-10 PROCEDURE — C2630 CATH, EP, COOL-TIP: HCPCS | Performed by: INTERNAL MEDICINE

## 2021-06-10 PROCEDURE — 37000009 HC ANESTHESIA EA ADD 15 MINS: Performed by: INTERNAL MEDICINE

## 2021-06-10 PROCEDURE — D9220A PRA ANESTHESIA: Mod: CRNA,,, | Performed by: NURSE ANESTHETIST, CERTIFIED REGISTERED

## 2021-06-10 PROCEDURE — 27201423 OPTIME MED/SURG SUP & DEVICES STERILE SUPPLY: Performed by: INTERNAL MEDICINE

## 2021-06-10 PROCEDURE — 25000003 PHARM REV CODE 250: Performed by: NURSE ANESTHETIST, CERTIFIED REGISTERED

## 2021-06-10 PROCEDURE — 93613 PR INTRACARD ELECTROPHYS 3-DIMENS MAPPING: ICD-10-PCS | Mod: ,,, | Performed by: INTERNAL MEDICINE

## 2021-06-10 PROCEDURE — C1730 CATH, EP, 19 OR FEW ELECT: HCPCS | Performed by: INTERNAL MEDICINE

## 2021-06-10 PROCEDURE — 93462 PR LEFT HEART CATH BY TRANSEPTAL PUNCTURE: ICD-10-PCS | Mod: ,,, | Performed by: INTERNAL MEDICINE

## 2021-06-10 PROCEDURE — 93010 EKG 12-LEAD: ICD-10-PCS | Mod: ,,, | Performed by: INTERNAL MEDICINE

## 2021-06-10 PROCEDURE — 25000003 PHARM REV CODE 250: Performed by: INTERNAL MEDICINE

## 2021-06-10 PROCEDURE — 37000008 HC ANESTHESIA 1ST 15 MINUTES: Performed by: INTERNAL MEDICINE

## 2021-06-10 PROCEDURE — 93005 ELECTROCARDIOGRAM TRACING: CPT | Mod: 59

## 2021-06-10 PROCEDURE — 93662 PR INTRACARD ECHO, THER/DX INTERVENT: ICD-10-PCS | Mod: 26,,, | Performed by: INTERNAL MEDICINE

## 2021-06-10 PROCEDURE — 63600175 PHARM REV CODE 636 W HCPCS: Performed by: NURSE ANESTHETIST, CERTIFIED REGISTERED

## 2021-06-10 PROCEDURE — 93462 L HRT CATH TRNSPTL PUNCTURE: CPT | Mod: ,,, | Performed by: INTERNAL MEDICINE

## 2021-06-10 PROCEDURE — C1894 INTRO/SHEATH, NON-LASER: HCPCS | Performed by: INTERNAL MEDICINE

## 2021-06-10 PROCEDURE — 93653 COMPRE EP EVAL TX SVT: CPT | Mod: ,,, | Performed by: INTERNAL MEDICINE

## 2021-06-10 PROCEDURE — 93653 PR ELECTROPHYS EVAL, COMPREHEN, W/SUPRAVENT TACHYCARD TRMT: ICD-10-PCS | Mod: ,,, | Performed by: INTERNAL MEDICINE

## 2021-06-10 PROCEDURE — 93621: ICD-10-PCS | Mod: 26,,, | Performed by: INTERNAL MEDICINE

## 2021-06-10 PROCEDURE — 93613 INTRACARDIAC EPHYS 3D MAPG: CPT | Mod: ,,, | Performed by: INTERNAL MEDICINE

## 2021-06-10 PROCEDURE — 93613 INTRACARDIAC EPHYS 3D MAPG: CPT | Performed by: INTERNAL MEDICINE

## 2021-06-10 PROCEDURE — 93662 INTRACARDIAC ECG (ICE): CPT | Performed by: INTERNAL MEDICINE

## 2021-06-10 PROCEDURE — D9220A PRA ANESTHESIA: ICD-10-PCS | Mod: ANES,,, | Performed by: ANESTHESIOLOGY

## 2021-06-10 PROCEDURE — 63600175 PHARM REV CODE 636 W HCPCS: Performed by: ANESTHESIOLOGY

## 2021-06-10 PROCEDURE — 93621 COMP EP EVL L PAC&REC C SINS: CPT | Mod: 26,,, | Performed by: INTERNAL MEDICINE

## 2021-06-10 PROCEDURE — 93662 INTRACARDIAC ECG (ICE): CPT | Mod: 26,,, | Performed by: INTERNAL MEDICINE

## 2021-06-10 PROCEDURE — 81025 URINE PREGNANCY TEST: CPT | Performed by: NURSE PRACTITIONER

## 2021-06-10 PROCEDURE — D9220A PRA ANESTHESIA: ICD-10-PCS | Mod: CRNA,,, | Performed by: NURSE ANESTHETIST, CERTIFIED REGISTERED

## 2021-06-10 PROCEDURE — D9220A PRA ANESTHESIA: Mod: ANES,,, | Performed by: ANESTHESIOLOGY

## 2021-06-10 RX ORDER — HEPARIN SODIUM 10000 [USP'U]/100ML
INJECTION, SOLUTION INTRAVENOUS CONTINUOUS PRN
Status: DISCONTINUED | OUTPATIENT
Start: 2021-06-10 | End: 2021-06-10

## 2021-06-10 RX ORDER — HEPARIN SOD,PORCINE/0.9 % NACL 1000/500ML
INTRAVENOUS SOLUTION INTRAVENOUS
Status: DISCONTINUED | OUTPATIENT
Start: 2021-06-10 | End: 2021-06-10 | Stop reason: HOSPADM

## 2021-06-10 RX ORDER — PROPOFOL 10 MG/ML
VIAL (ML) INTRAVENOUS CONTINUOUS PRN
Status: DISCONTINUED | OUTPATIENT
Start: 2021-06-10 | End: 2021-06-10

## 2021-06-10 RX ORDER — LIDOCAINE HYDROCHLORIDE 20 MG/ML
INJECTION INTRAVENOUS
Status: DISCONTINUED | OUTPATIENT
Start: 2021-06-10 | End: 2021-06-10

## 2021-06-10 RX ORDER — ACETAMINOPHEN 325 MG/1
650 TABLET ORAL EVERY 4 HOURS PRN
Status: DISCONTINUED | OUTPATIENT
Start: 2021-06-10 | End: 2021-06-10 | Stop reason: HOSPADM

## 2021-06-10 RX ORDER — PROPOFOL 10 MG/ML
VIAL (ML) INTRAVENOUS
Status: DISCONTINUED | OUTPATIENT
Start: 2021-06-10 | End: 2021-06-10

## 2021-06-10 RX ORDER — ASPIRIN 81 MG/1
81 TABLET ORAL DAILY
Qty: 30 TABLET | Refills: 11 | Status: SHIPPED | OUTPATIENT
Start: 2021-06-10 | End: 2021-09-16

## 2021-06-10 RX ORDER — PROTAMINE SULFATE 10 MG/ML
INJECTION, SOLUTION INTRAVENOUS
Status: DISCONTINUED | OUTPATIENT
Start: 2021-06-10 | End: 2021-06-10

## 2021-06-10 RX ORDER — SODIUM CHLORIDE 0.9 % (FLUSH) 0.9 %
3 SYRINGE (ML) INJECTION
Status: DISCONTINUED | OUTPATIENT
Start: 2021-06-10 | End: 2021-06-10 | Stop reason: HOSPADM

## 2021-06-10 RX ORDER — LIDOCAINE HYDROCHLORIDE 20 MG/ML
INJECTION, SOLUTION INFILTRATION; PERINEURAL
Status: DISCONTINUED | OUTPATIENT
Start: 2021-06-10 | End: 2021-06-10 | Stop reason: HOSPADM

## 2021-06-10 RX ORDER — HEPARIN SODIUM 1000 [USP'U]/ML
INJECTION, SOLUTION INTRAVENOUS; SUBCUTANEOUS
Status: DISCONTINUED | OUTPATIENT
Start: 2021-06-10 | End: 2021-06-10

## 2021-06-10 RX ORDER — FENTANYL CITRATE 50 UG/ML
INJECTION, SOLUTION INTRAMUSCULAR; INTRAVENOUS
Status: DISCONTINUED | OUTPATIENT
Start: 2021-06-10 | End: 2021-06-10

## 2021-06-10 RX ADMIN — ACETAMINOPHEN 650 MG: 325 TABLET ORAL at 03:06

## 2021-06-10 RX ADMIN — PROTAMINE SULFATE 10 MG: 10 INJECTION, SOLUTION INTRAVENOUS at 12:06

## 2021-06-10 RX ADMIN — PROTAMINE SULFATE 60 MG: 10 INJECTION, SOLUTION INTRAVENOUS at 12:06

## 2021-06-10 RX ADMIN — Medication 100 MCG/KG/MIN: at 09:06

## 2021-06-10 RX ADMIN — FENTANYL CITRATE 50 MCG: 50 INJECTION INTRAMUSCULAR; INTRAVENOUS at 12:06

## 2021-06-10 RX ADMIN — PROPOFOL 50 MG: 10 INJECTION, EMULSION INTRAVENOUS at 09:06

## 2021-06-10 RX ADMIN — HEPARIN SODIUM 2000 UNITS: 1000 INJECTION INTRAVENOUS; SUBCUTANEOUS at 12:06

## 2021-06-10 RX ADMIN — HEPARIN SODIUM AND DEXTROSE 12 UNITS/KG/HR: 10000; 5 INJECTION INTRAVENOUS at 10:06

## 2021-06-10 RX ADMIN — HEPARIN SODIUM 14000 UNITS: 1000 INJECTION INTRAVENOUS; SUBCUTANEOUS at 10:06

## 2021-06-10 RX ADMIN — HEPARIN SODIUM 2000 UNITS: 1000 INJECTION INTRAVENOUS; SUBCUTANEOUS at 11:06

## 2021-06-10 RX ADMIN — LIDOCAINE HYDROCHLORIDE 100 MG: 20 INJECTION, SOLUTION INTRAVENOUS at 09:06

## 2021-06-10 RX ADMIN — PROPOFOL 30 MG: 10 INJECTION, EMULSION INTRAVENOUS at 09:06

## 2021-06-10 RX ADMIN — SODIUM CHLORIDE: 0.9 INJECTION, SOLUTION INTRAVENOUS at 09:06

## 2021-06-15 ENCOUNTER — TELEPHONE (OUTPATIENT)
Dept: ELECTROPHYSIOLOGY | Facility: CLINIC | Age: 46
End: 2021-06-15

## 2021-06-15 ENCOUNTER — PATIENT MESSAGE (OUTPATIENT)
Dept: ELECTROPHYSIOLOGY | Facility: CLINIC | Age: 46
End: 2021-06-15

## 2021-07-13 ENCOUNTER — PATIENT MESSAGE (OUTPATIENT)
Dept: OBSTETRICS AND GYNECOLOGY | Facility: CLINIC | Age: 46
End: 2021-07-13

## 2021-08-02 ENCOUNTER — LAB VISIT (OUTPATIENT)
Dept: LAB | Facility: OTHER | Age: 46
End: 2021-08-02
Attending: OBSTETRICS & GYNECOLOGY
Payer: COMMERCIAL

## 2021-08-02 ENCOUNTER — OFFICE VISIT (OUTPATIENT)
Dept: OBSTETRICS AND GYNECOLOGY | Facility: CLINIC | Age: 46
End: 2021-08-02
Attending: OBSTETRICS & GYNECOLOGY
Payer: COMMERCIAL

## 2021-08-02 VITALS
DIASTOLIC BLOOD PRESSURE: 72 MMHG | BODY MASS INDEX: 23.98 KG/M2 | SYSTOLIC BLOOD PRESSURE: 116 MMHG | WEIGHT: 152.75 LBS | HEIGHT: 67 IN

## 2021-08-02 DIAGNOSIS — N93.9 ABNORMAL UTERINE BLEEDING (AUB): Primary | ICD-10-CM

## 2021-08-02 DIAGNOSIS — N93.9 ABNORMAL UTERINE BLEEDING (AUB): ICD-10-CM

## 2021-08-02 LAB
B-HCG UR QL: NEGATIVE
BASOPHILS # BLD AUTO: 0.04 K/UL (ref 0–0.2)
BASOPHILS NFR BLD: 0.4 % (ref 0–1.9)
CTP QC/QA: YES
DIFFERENTIAL METHOD: ABNORMAL
EOSINOPHIL # BLD AUTO: 0.3 K/UL (ref 0–0.5)
EOSINOPHIL NFR BLD: 3.6 % (ref 0–8)
ERYTHROCYTE [DISTWIDTH] IN BLOOD BY AUTOMATED COUNT: 12.2 % (ref 11.5–14.5)
HCT VFR BLD AUTO: 42 % (ref 37–48.5)
HGB BLD-MCNC: 14.1 G/DL (ref 12–16)
IMM GRANULOCYTES # BLD AUTO: 0.02 K/UL (ref 0–0.04)
IMM GRANULOCYTES NFR BLD AUTO: 0.2 % (ref 0–0.5)
LYMPHOCYTES # BLD AUTO: 3.1 K/UL (ref 1–4.8)
LYMPHOCYTES NFR BLD: 33.4 % (ref 18–48)
MCH RBC QN AUTO: 31.1 PG (ref 27–31)
MCHC RBC AUTO-ENTMCNC: 33.6 G/DL (ref 32–36)
MCV RBC AUTO: 93 FL (ref 82–98)
MONOCYTES # BLD AUTO: 0.9 K/UL (ref 0.3–1)
MONOCYTES NFR BLD: 10 % (ref 4–15)
NEUTROPHILS # BLD AUTO: 4.8 K/UL (ref 1.8–7.7)
NEUTROPHILS NFR BLD: 52.4 % (ref 38–73)
NRBC BLD-RTO: 0 /100 WBC
PLATELET # BLD AUTO: 306 K/UL (ref 150–450)
PMV BLD AUTO: 10.2 FL (ref 9.2–12.9)
RBC # BLD AUTO: 4.54 M/UL (ref 4–5.4)
TSH SERPL DL<=0.005 MIU/L-ACNC: 0.66 UIU/ML (ref 0.4–4)
WBC # BLD AUTO: 9.19 K/UL (ref 3.9–12.7)

## 2021-08-02 PROCEDURE — 99213 OFFICE O/P EST LOW 20 MIN: CPT | Mod: 25,S$GLB,, | Performed by: OBSTETRICS & GYNECOLOGY

## 2021-08-02 PROCEDURE — 1159F MED LIST DOCD IN RCRD: CPT | Mod: CPTII,S$GLB,, | Performed by: OBSTETRICS & GYNECOLOGY

## 2021-08-02 PROCEDURE — 85025 COMPLETE CBC W/AUTO DIFF WBC: CPT | Performed by: OBSTETRICS & GYNECOLOGY

## 2021-08-02 PROCEDURE — 88305 TISSUE EXAM BY PATHOLOGIST: CPT | Mod: 26,,, | Performed by: PATHOLOGY

## 2021-08-02 PROCEDURE — 58100 BIOPSY OF UTERUS LINING: CPT | Mod: S$GLB,,, | Performed by: OBSTETRICS & GYNECOLOGY

## 2021-08-02 PROCEDURE — 1160F RVW MEDS BY RX/DR IN RCRD: CPT | Mod: CPTII,S$GLB,, | Performed by: OBSTETRICS & GYNECOLOGY

## 2021-08-02 PROCEDURE — 81025 POCT URINE PREGNANCY: ICD-10-PCS | Mod: S$GLB,,, | Performed by: OBSTETRICS & GYNECOLOGY

## 2021-08-02 PROCEDURE — 58100 PR BIOPSY OF UTERUS LINING: ICD-10-PCS | Mod: S$GLB,,, | Performed by: OBSTETRICS & GYNECOLOGY

## 2021-08-02 PROCEDURE — 99213 PR OFFICE/OUTPT VISIT, EST, LEVL III, 20-29 MIN: ICD-10-PCS | Mod: 25,S$GLB,, | Performed by: OBSTETRICS & GYNECOLOGY

## 2021-08-02 PROCEDURE — 1160F PR REVIEW ALL MEDS BY PRESCRIBER/CLIN PHARMACIST DOCUMENTED: ICD-10-PCS | Mod: CPTII,S$GLB,, | Performed by: OBSTETRICS & GYNECOLOGY

## 2021-08-02 PROCEDURE — 81025 URINE PREGNANCY TEST: CPT | Mod: S$GLB,,, | Performed by: OBSTETRICS & GYNECOLOGY

## 2021-08-02 PROCEDURE — 84443 ASSAY THYROID STIM HORMONE: CPT | Performed by: OBSTETRICS & GYNECOLOGY

## 2021-08-02 PROCEDURE — 3008F BODY MASS INDEX DOCD: CPT | Mod: CPTII,S$GLB,, | Performed by: OBSTETRICS & GYNECOLOGY

## 2021-08-02 PROCEDURE — 3074F PR MOST RECENT SYSTOLIC BLOOD PRESSURE < 130 MM HG: ICD-10-PCS | Mod: CPTII,S$GLB,, | Performed by: OBSTETRICS & GYNECOLOGY

## 2021-08-02 PROCEDURE — 1126F PR PAIN SEVERITY QUANTIFIED, NO PAIN PRESENT: ICD-10-PCS | Mod: CPTII,S$GLB,, | Performed by: OBSTETRICS & GYNECOLOGY

## 2021-08-02 PROCEDURE — 3078F PR MOST RECENT DIASTOLIC BLOOD PRESSURE < 80 MM HG: ICD-10-PCS | Mod: CPTII,S$GLB,, | Performed by: OBSTETRICS & GYNECOLOGY

## 2021-08-02 PROCEDURE — 1159F PR MEDICATION LIST DOCUMENTED IN MEDICAL RECORD: ICD-10-PCS | Mod: CPTII,S$GLB,, | Performed by: OBSTETRICS & GYNECOLOGY

## 2021-08-02 PROCEDURE — 88305 TISSUE EXAM BY PATHOLOGIST: ICD-10-PCS | Mod: 26,,, | Performed by: PATHOLOGY

## 2021-08-02 PROCEDURE — 99999 PR PBB SHADOW E&M-EST. PATIENT-LVL III: ICD-10-PCS | Mod: PBBFAC,,, | Performed by: OBSTETRICS & GYNECOLOGY

## 2021-08-02 PROCEDURE — 3078F DIAST BP <80 MM HG: CPT | Mod: CPTII,S$GLB,, | Performed by: OBSTETRICS & GYNECOLOGY

## 2021-08-02 PROCEDURE — 3008F PR BODY MASS INDEX (BMI) DOCUMENTED: ICD-10-PCS | Mod: CPTII,S$GLB,, | Performed by: OBSTETRICS & GYNECOLOGY

## 2021-08-02 PROCEDURE — 1126F AMNT PAIN NOTED NONE PRSNT: CPT | Mod: CPTII,S$GLB,, | Performed by: OBSTETRICS & GYNECOLOGY

## 2021-08-02 PROCEDURE — 36415 COLL VENOUS BLD VENIPUNCTURE: CPT | Performed by: OBSTETRICS & GYNECOLOGY

## 2021-08-02 PROCEDURE — 99999 PR PBB SHADOW E&M-EST. PATIENT-LVL III: CPT | Mod: PBBFAC,,, | Performed by: OBSTETRICS & GYNECOLOGY

## 2021-08-02 PROCEDURE — 88305 TISSUE EXAM BY PATHOLOGIST: CPT | Performed by: PATHOLOGY

## 2021-08-02 PROCEDURE — 3074F SYST BP LT 130 MM HG: CPT | Mod: CPTII,S$GLB,, | Performed by: OBSTETRICS & GYNECOLOGY

## 2021-08-03 ENCOUNTER — HOSPITAL ENCOUNTER (OUTPATIENT)
Dept: RADIOLOGY | Facility: OTHER | Age: 46
Discharge: HOME | End: 2021-08-03
Attending: OBSTETRICS & GYNECOLOGY
Payer: COMMERCIAL

## 2021-08-03 DIAGNOSIS — N93.9 ABNORMAL UTERINE BLEEDING (AUB): ICD-10-CM

## 2021-08-03 PROCEDURE — 76830 TRANSVAGINAL US NON-OB: CPT | Mod: 26,,, | Performed by: RADIOLOGY

## 2021-08-03 PROCEDURE — 76830 US PELVIS COMP WITH TRANSVAG NON-OB (XPD): ICD-10-PCS | Mod: 26,,, | Performed by: RADIOLOGY

## 2021-08-03 PROCEDURE — 76856 US EXAM PELVIC COMPLETE: CPT | Mod: TC

## 2021-08-03 PROCEDURE — 76856 US EXAM PELVIC COMPLETE: CPT | Mod: 26,,, | Performed by: RADIOLOGY

## 2021-08-03 PROCEDURE — 76856 US PELVIS COMP WITH TRANSVAG NON-OB (XPD): ICD-10-PCS | Mod: 26,,, | Performed by: RADIOLOGY

## 2021-08-05 ENCOUNTER — PATIENT MESSAGE (OUTPATIENT)
Dept: OBSTETRICS AND GYNECOLOGY | Facility: CLINIC | Age: 46
End: 2021-08-05

## 2021-08-10 LAB
FINAL PATHOLOGIC DIAGNOSIS: NORMAL
GROSS: NORMAL
Lab: NORMAL

## 2021-08-11 ENCOUNTER — PATIENT MESSAGE (OUTPATIENT)
Dept: OBSTETRICS AND GYNECOLOGY | Facility: CLINIC | Age: 46
End: 2021-08-11

## 2021-08-11 DIAGNOSIS — N93.9 ABNORMAL UTERINE BLEEDING (AUB): Primary | ICD-10-CM

## 2021-08-11 RX ORDER — NORGESTIMATE AND ETHINYL ESTRADIOL 7DAYSX3 LO
1 KIT ORAL DAILY
Qty: 90 TABLET | Refills: 3 | Status: SHIPPED | OUTPATIENT
Start: 2021-08-11 | End: 2022-07-11 | Stop reason: SDUPTHER

## 2021-08-16 ENCOUNTER — PATIENT MESSAGE (OUTPATIENT)
Dept: INTERNAL MEDICINE | Facility: CLINIC | Age: 46
End: 2021-08-16

## 2021-08-16 DIAGNOSIS — J98.01 BRONCHOSPASM: ICD-10-CM

## 2021-08-17 RX ORDER — ALBUTEROL SULFATE 90 UG/1
2 AEROSOL, METERED RESPIRATORY (INHALATION) EVERY 4 HOURS PRN
Qty: 18 G | Refills: 1 | Status: SHIPPED | OUTPATIENT
Start: 2021-08-17

## 2021-09-02 ENCOUNTER — OFFICE VISIT (OUTPATIENT)
Dept: URGENT CARE | Facility: CLINIC | Age: 46
End: 2021-09-02
Payer: COMMERCIAL

## 2021-09-02 VITALS
BODY MASS INDEX: 23.54 KG/M2 | OXYGEN SATURATION: 99 % | TEMPERATURE: 99 F | RESPIRATION RATE: 15 BRPM | HEIGHT: 67 IN | HEART RATE: 90 BPM | WEIGHT: 150 LBS | SYSTOLIC BLOOD PRESSURE: 118 MMHG | DIASTOLIC BLOOD PRESSURE: 82 MMHG

## 2021-09-02 DIAGNOSIS — J40 BRONCHITIS WITH WHEEZING: Primary | ICD-10-CM

## 2021-09-02 PROCEDURE — 1160F RVW MEDS BY RX/DR IN RCRD: CPT | Mod: CPTII,S$GLB,, | Performed by: PHYSICIAN ASSISTANT

## 2021-09-02 PROCEDURE — 99214 OFFICE O/P EST MOD 30 MIN: CPT | Mod: S$GLB,,, | Performed by: PHYSICIAN ASSISTANT

## 2021-09-02 PROCEDURE — 1160F PR REVIEW ALL MEDS BY PRESCRIBER/CLIN PHARMACIST DOCUMENTED: ICD-10-PCS | Mod: CPTII,S$GLB,, | Performed by: PHYSICIAN ASSISTANT

## 2021-09-02 PROCEDURE — 3079F PR MOST RECENT DIASTOLIC BLOOD PRESSURE 80-89 MM HG: ICD-10-PCS | Mod: CPTII,S$GLB,, | Performed by: PHYSICIAN ASSISTANT

## 2021-09-02 PROCEDURE — 3074F SYST BP LT 130 MM HG: CPT | Mod: CPTII,S$GLB,, | Performed by: PHYSICIAN ASSISTANT

## 2021-09-02 PROCEDURE — 1159F MED LIST DOCD IN RCRD: CPT | Mod: CPTII,S$GLB,, | Performed by: PHYSICIAN ASSISTANT

## 2021-09-02 PROCEDURE — 3079F DIAST BP 80-89 MM HG: CPT | Mod: CPTII,S$GLB,, | Performed by: PHYSICIAN ASSISTANT

## 2021-09-02 PROCEDURE — 3008F PR BODY MASS INDEX (BMI) DOCUMENTED: ICD-10-PCS | Mod: CPTII,S$GLB,, | Performed by: PHYSICIAN ASSISTANT

## 2021-09-02 PROCEDURE — 3074F PR MOST RECENT SYSTOLIC BLOOD PRESSURE < 130 MM HG: ICD-10-PCS | Mod: CPTII,S$GLB,, | Performed by: PHYSICIAN ASSISTANT

## 2021-09-02 PROCEDURE — 99214 PR OFFICE/OUTPT VISIT, EST, LEVL IV, 30-39 MIN: ICD-10-PCS | Mod: S$GLB,,, | Performed by: PHYSICIAN ASSISTANT

## 2021-09-02 PROCEDURE — 3008F BODY MASS INDEX DOCD: CPT | Mod: CPTII,S$GLB,, | Performed by: PHYSICIAN ASSISTANT

## 2021-09-02 PROCEDURE — 1159F PR MEDICATION LIST DOCUMENTED IN MEDICAL RECORD: ICD-10-PCS | Mod: CPTII,S$GLB,, | Performed by: PHYSICIAN ASSISTANT

## 2021-09-02 RX ORDER — PREDNISONE 20 MG/1
40 TABLET ORAL DAILY
Qty: 10 TABLET | Refills: 0 | OUTPATIENT
Start: 2021-09-02 | End: 2021-09-02

## 2021-09-02 RX ORDER — PREDNISONE 20 MG/1
40 TABLET ORAL DAILY
Qty: 10 TABLET | Refills: 0 | Status: SHIPPED | OUTPATIENT
Start: 2021-09-02 | End: 2021-09-07

## 2021-09-02 RX ORDER — ALBUTEROL SULFATE 90 UG/1
2 AEROSOL, METERED RESPIRATORY (INHALATION) EVERY 6 HOURS PRN
Qty: 18 G | Refills: 0 | OUTPATIENT
Start: 2021-09-02 | End: 2021-09-02

## 2021-09-02 RX ORDER — ALBUTEROL SULFATE 90 UG/1
2 AEROSOL, METERED RESPIRATORY (INHALATION) EVERY 6 HOURS PRN
Qty: 18 G | Refills: 0 | Status: SHIPPED | OUTPATIENT
Start: 2021-09-02 | End: 2022-09-02

## 2021-09-16 ENCOUNTER — OFFICE VISIT (OUTPATIENT)
Dept: ELECTROPHYSIOLOGY | Facility: CLINIC | Age: 46
End: 2021-09-16
Payer: COMMERCIAL

## 2021-09-16 ENCOUNTER — HOSPITAL ENCOUNTER (OUTPATIENT)
Dept: CARDIOLOGY | Facility: CLINIC | Age: 46
Discharge: HOME | End: 2021-09-16
Payer: COMMERCIAL

## 2021-09-16 VITALS
HEART RATE: 77 BPM | SYSTOLIC BLOOD PRESSURE: 126 MMHG | BODY MASS INDEX: 25.01 KG/M2 | DIASTOLIC BLOOD PRESSURE: 61 MMHG | HEIGHT: 67 IN | WEIGHT: 159.38 LBS

## 2021-09-16 DIAGNOSIS — Z98.890 HISTORY OF RADIOFREQUENCY ABLATION (RFA) PROCEDURE FOR CARDIAC ARRHYTHMIA: ICD-10-CM

## 2021-09-16 DIAGNOSIS — I49.8 OTHER SPECIFIED CARDIAC ARRHYTHMIAS: ICD-10-CM

## 2021-09-16 DIAGNOSIS — I47.10 SVT (SUPRAVENTRICULAR TACHYCARDIA): Primary | ICD-10-CM

## 2021-09-16 PROCEDURE — 93005 RHYTHM STRIP: ICD-10-PCS | Mod: S$GLB,,, | Performed by: INTERNAL MEDICINE

## 2021-09-16 PROCEDURE — 3008F BODY MASS INDEX DOCD: CPT | Mod: CPTII,S$GLB,, | Performed by: NURSE PRACTITIONER

## 2021-09-16 PROCEDURE — 99214 OFFICE O/P EST MOD 30 MIN: CPT | Mod: S$GLB,,, | Performed by: NURSE PRACTITIONER

## 2021-09-16 PROCEDURE — 3078F DIAST BP <80 MM HG: CPT | Mod: CPTII,S$GLB,, | Performed by: NURSE PRACTITIONER

## 2021-09-16 PROCEDURE — 1159F MED LIST DOCD IN RCRD: CPT | Mod: CPTII,S$GLB,, | Performed by: NURSE PRACTITIONER

## 2021-09-16 PROCEDURE — 99214 PR OFFICE/OUTPT VISIT, EST, LEVL IV, 30-39 MIN: ICD-10-PCS | Mod: S$GLB,,, | Performed by: NURSE PRACTITIONER

## 2021-09-16 PROCEDURE — 1160F PR REVIEW ALL MEDS BY PRESCRIBER/CLIN PHARMACIST DOCUMENTED: ICD-10-PCS | Mod: CPTII,S$GLB,, | Performed by: NURSE PRACTITIONER

## 2021-09-16 PROCEDURE — 93005 ELECTROCARDIOGRAM TRACING: CPT | Mod: S$GLB,,, | Performed by: INTERNAL MEDICINE

## 2021-09-16 PROCEDURE — 3074F PR MOST RECENT SYSTOLIC BLOOD PRESSURE < 130 MM HG: ICD-10-PCS | Mod: CPTII,S$GLB,, | Performed by: NURSE PRACTITIONER

## 2021-09-16 PROCEDURE — 3074F SYST BP LT 130 MM HG: CPT | Mod: CPTII,S$GLB,, | Performed by: NURSE PRACTITIONER

## 2021-09-16 PROCEDURE — 99999 PR PBB SHADOW E&M-EST. PATIENT-LVL III: ICD-10-PCS | Mod: PBBFAC,,, | Performed by: NURSE PRACTITIONER

## 2021-09-16 PROCEDURE — 3008F PR BODY MASS INDEX (BMI) DOCUMENTED: ICD-10-PCS | Mod: CPTII,S$GLB,, | Performed by: NURSE PRACTITIONER

## 2021-09-16 PROCEDURE — 93010 RHYTHM STRIP: ICD-10-PCS | Mod: S$GLB,,, | Performed by: INTERNAL MEDICINE

## 2021-09-16 PROCEDURE — 93010 ELECTROCARDIOGRAM REPORT: CPT | Mod: S$GLB,,, | Performed by: INTERNAL MEDICINE

## 2021-09-16 PROCEDURE — 3078F PR MOST RECENT DIASTOLIC BLOOD PRESSURE < 80 MM HG: ICD-10-PCS | Mod: CPTII,S$GLB,, | Performed by: NURSE PRACTITIONER

## 2021-09-16 PROCEDURE — 99999 PR PBB SHADOW E&M-EST. PATIENT-LVL III: CPT | Mod: PBBFAC,,, | Performed by: NURSE PRACTITIONER

## 2021-09-16 PROCEDURE — 1159F PR MEDICATION LIST DOCUMENTED IN MEDICAL RECORD: ICD-10-PCS | Mod: CPTII,S$GLB,, | Performed by: NURSE PRACTITIONER

## 2021-09-16 PROCEDURE — 1160F RVW MEDS BY RX/DR IN RCRD: CPT | Mod: CPTII,S$GLB,, | Performed by: NURSE PRACTITIONER

## 2021-09-20 ENCOUNTER — PATIENT MESSAGE (OUTPATIENT)
Dept: CARDIOLOGY | Facility: CLINIC | Age: 46
End: 2021-09-20

## 2022-02-23 DIAGNOSIS — Z12.31 OTHER SCREENING MAMMOGRAM: ICD-10-CM

## 2022-03-30 ENCOUNTER — OFFICE VISIT (OUTPATIENT)
Dept: URGENT CARE | Facility: CLINIC | Age: 47
End: 2022-03-30
Payer: OTHER MISCELLANEOUS

## 2022-03-30 VITALS
DIASTOLIC BLOOD PRESSURE: 82 MMHG | WEIGHT: 159 LBS | TEMPERATURE: 98 F | OXYGEN SATURATION: 99 % | SYSTOLIC BLOOD PRESSURE: 130 MMHG | BODY MASS INDEX: 24.96 KG/M2 | RESPIRATION RATE: 18 BRPM | HEART RATE: 74 BPM | HEIGHT: 67 IN

## 2022-03-30 DIAGNOSIS — M79.642 LEFT HAND PAIN: ICD-10-CM

## 2022-03-30 DIAGNOSIS — S63.693A OTHER SPRAIN OF LEFT MIDDLE FINGER, INITIAL ENCOUNTER: ICD-10-CM

## 2022-03-30 DIAGNOSIS — S63.655A SPRAIN OF METACARPOPHALANGEAL (MCP) JOINT OF LEFT RING FINGER, INITIAL ENCOUNTER: ICD-10-CM
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
## 2022-03-30 DIAGNOSIS — S62.653A NONDISPLACED FRACTURE OF MIDDLE PHALANX OF LEFT MIDDLE FINGER, INITIAL ENCOUNTER FOR CLOSED FRACTURE: ICD-10-CM

## 2022-03-30 PROCEDURE — 73130 X-RAY EXAM OF HAND: CPT | Mod: FY,LT,S$GLB, | Performed by: RADIOLOGY

## 2022-03-30 PROCEDURE — 99203 OFFICE O/P NEW LOW 30 MIN: CPT | Mod: S$GLB,,, | Performed by: PHYSICIAN ASSISTANT

## 2022-03-30 PROCEDURE — 99203 PR OFFICE/OUTPT VISIT, NEW, LEVL III, 30-44 MIN: ICD-10-PCS | Mod: S$GLB,,, | Performed by: PHYSICIAN ASSISTANT

## 2022-03-30 PROCEDURE — 73130 XR HAND COMPLETE 3 VIEW LEFT: ICD-10-PCS | Mod: FY,LT,S$GLB, | Performed by: RADIOLOGY

## 2022-03-30 NOTE — PROGRESS NOTES
Subjective:       Patient ID: Janae Soto is a 46 y.o. female.    Chief Complaint: Hand Pain      46-year-old female clinic for evaluation of work-related.  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  She impacted her left hand mostly; she is left handed.  There is bruising and swelling of the left 3rd and 4th finger.  There is pain and numbness with all palpation.  She had ice and taken six hundred ibuprofen with good relief yesterday.  Not taking anything for symptoms today.    MA NOTES:  Pt presents with complaint of fall resulting in left hand pain, bruising.  Pt states her fourth and third digit are painful and states she has limited ROM.  Pt states she applied ice to the affected area yesterday    Hand Pain   The incident occurred 12 to 24 hours ago. The incident occurred at work. The injury mechanism was a fall. The pain is present in the left hand and left fingers. The quality of the pain is described as aching. The pain does not radiate. The pain is at a severity of 5/10. The pain is moderate. The pain has been fluctuating since the incident. Associated symptoms include numbness. Pertinent negatives include no chest pain, muscle weakness or tingling. The symptoms are aggravated by palpation and movement. She has tried ice and NSAIDs for the symptoms. The treatment provided mild relief.       Constitution: Negative for activity change, appetite change, chills, sweating, fatigue, fever and generalized weakness.   HENT: Negative for ear pain, hearing loss, facial swelling, congestion, postnasal drip, sinus pain, sinus pressure, sore throat, trouble swallowing and voice change.    Neck: Negative for neck pain, neck stiffness and painful lymph nodes.   Cardiovascular: Negative for chest pain, leg swelling, palpitations, sob on exertion and passing out.   Eyes: Negative for eye discharge, eye pain, photophobia, vision loss, double vision and blurred vision.   Respiratory:  Negative for chest tightness, cough, sputum production, bloody sputum, COPD, shortness of breath, stridor, wheezing and asthma.    Gastrointestinal: Negative for abdominal pain, nausea, vomiting, constipation, diarrhea, bright red blood in stool, rectal bleeding, heartburn and bowel incontinence.   Genitourinary: Negative for dysuria, frequency, urgency, urine decreased, flank pain, bladder incontinence and hematuria.   Musculoskeletal: Positive for pain, trauma, joint pain and joint swelling. Negative for abnormal ROM of joint, muscle cramps and muscle ache.   Skin: Positive for bruising. Negative for color change, pale, rash and wound.   Allergic/Immunologic: Negative for seasonal allergies, asthma and immunocompromised state.   Neurological: Positive for numbness. Negative for dizziness, history of vertigo, light-headedness, passing out, facial drooping, speech difficulty, coordination disturbances, loss of balance, headaches, disorientation, altered mental status, loss of consciousness, tingling and seizures.   Hematologic/Lymphatic: Negative for swollen lymph nodes, easy bruising/bleeding and trouble clotting. Does not bruise/bleed easily.   Psychiatric/Behavioral: Negative for altered mental status and disorientation.          Past Medical History:   Diagnosis Date    Asthma        Objective:      Physical Exam  Vitals and nursing note reviewed.   Constitutional:       General: She is not in acute distress.     Appearance: Normal appearance. She is well-developed. She is not diaphoretic.   HENT:      Head: Normocephalic and atraumatic.      Right Ear: Ear canal and external ear normal.      Left Ear: Ear canal and external ear normal.      Nose: Nose normal.      Mouth/Throat:      Pharynx: Oropharynx is clear.   Eyes:      General:         Right eye: No discharge.         Left eye: No discharge.      Conjunctiva/sclera: Conjunctivae normal.      Pupils: Pupils are equal, round, and reactive to light.    Cardiovascular:      Rate and Rhythm: Normal rate and regular rhythm.      Pulses: Normal pulses.      Heart sounds: Normal heart sounds.   Pulmonary:      Effort: Pulmonary effort is normal. No respiratory distress.      Breath sounds: Normal breath sounds. No wheezing or rales.   Abdominal:      General: Bowel sounds are normal. There is no distension.      Palpations: Abdomen is soft. There is no mass.      Tenderness: There is no abdominal tenderness. There is no guarding or rebound.   Musculoskeletal:         General: Swelling, tenderness and signs of injury present. No deformity. Normal range of motion.        Hands:       Cervical back: Normal range of motion and neck supple.      Right lower leg: No edema.      Left lower leg: No edema.      Comments: There is bruising, swelling, tenderness to left entire 3rd and 4th digit.  There is decreased ROM secondary to swelling and pain.  There is decreased sensation secondary to swelling and pain.   Lymphadenopathy:      Cervical: No cervical adenopathy.   Skin:     General: Skin is warm and dry.      Capillary Refill: Capillary refill takes less than 2 seconds.      Findings: Bruising present.   Neurological:      Mental Status: She is alert and oriented to person, place, and time.      Cranial Nerves: No cranial nerve deficit.      Sensory: No sensory deficit.      Motor: No abnormal muscle tone.      Coordination: Coordination normal.      Deep Tendon Reflexes: Reflexes normal.   Psychiatric:         Behavior: Behavior normal.         Thought Content: Thought content normal.         Judgment: Judgment normal.               XR HAND COMPLETE 3 VIEW LEFT    Result Date: 3/30/2022  EXAMINATION: XR HAND COMPLETE 3 VIEW LEFT CLINICAL HISTORY: . Pain in left hand TECHNIQUE: PA, lateral, and oblique views of the left hand were performed. COMPARISON: None FINDINGS: Nondisplaced fracture along the volar aspect of the base of the middle phalanx 3rd digit with surrounding  soft tissue edema. Soft tissue edema is seen along the proximal phalanx of the 4th digit with no obvious fracture.  If there is point tenderness, dedicated views of the 4th digit may be considered in further evaluating.     Abnormal study as above. Electronically signed by: Martina Gama Date:    03/30/2022 Time:    13:48    Assessment:       1. Work related injury    2. Left hand pain    3. Other sprain of left middle finger, initial encounter    4. Sprain of metacarpophalangeal (MCP) joint of left ring finger, initial encounter    5. Nondisplaced fracture of middle phalanx of left middle finger, initial encounter for closed fracture          46 y.o. female who presents with work related injury. Neurovascularly intact. Xrays done which showed possible small non-displaced fracture at base of middle phalanx of left 3rd digit.  Discussed all fingers friend Ace wrap.  However, since there is significant swelling and pain, patient declined splinting today.  This can be readdressed during occupational medicine visit tomorrow @11:30a.  Discussed RICE therapy and OTC pain med prn. Patient will follow up with Occupational Medicine tomorrow for work related injury.     Patient needs to be evaluated by Occupational medicine to determine return to work status.          Plan:                   No follow-ups on file.

## 2022-03-30 NOTE — LETTER
Urgent Care - [address] [address]  Phone: [phone]  Fax: [phone]  [facility]: [id number]    Pt Name: Jaane Soto  Injury Date: 03/29/2022   Employee ID: [id number] Date of First Treatment: 03/30/2022   Company: [employer] to [id number]      Appointment Time: 11:40 AM Arrived:12:25p   Provider: Jamar Morillo PA-C Time Out:1:50p     Office Treatment:   1. Work related injury    2. Left hand pain    3. Other sprain of left middle finger, initial encounter    4. Sprain of metacarpophalangeal (MCP) joint of left ring finger, initial encounter                     - OTC Tylenol/anti-inflammatory as needed for pain. TAKE 400-600MG EVERY 8 HOUR WITH FOOD FOR PAIN. DO NOT MAX OUT 4000MG MAX 24HR/DAY.  - continue ice compression, rice therapy, and muscle stretches.     - You need to evaluated by Occupational medicine to determine your return to work status.     - if no improvement or worsening symptoms, recommend follow-up with *Occupational medicine for further evaluation and for work related injury.  Please call the number below to set up appointment; a referral has been placed. Or you can choose another location on form given.     [facility]  [address] [facility] [address] [place] [id number] [facility] [address] [place] [address].   Please call [phone]

## 2022-03-31 ENCOUNTER — OFFICE VISIT (OUTPATIENT)
Dept: URGENT CARE | Facility: CLINIC | Age: 47
End: 2022-03-31
Payer: OTHER MISCELLANEOUS

## 2022-03-31 VITALS
OXYGEN SATURATION: 98 % | SYSTOLIC BLOOD PRESSURE: 125 MMHG | HEART RATE: 91 BPM | DIASTOLIC BLOOD PRESSURE: 75 MMHG | RESPIRATION RATE: 16 BRPM | TEMPERATURE: 98 F

## 2022-03-31 DIAGNOSIS — S62.653A NONDISPLACED FRACTURE OF MIDDLE PHALANX OF LEFT MIDDLE FINGER, INITIAL ENCOUNTER FOR CLOSED FRACTURE: Primary | ICD-10-CM

## 2022-03-31 DIAGNOSIS — S63.655A SPRAIN OF METACARPOPHALANGEAL (MCP) JOINT OF LEFT RING FINGER, INITIAL ENCOUNTER: ICD-10-CM

## 2022-03-31 DIAGNOSIS — Z02.6 ENCOUNTER RELATED TO WORKER'S COMPENSATION CLAIM: ICD-10-CM

## 2022-03-31 DIAGNOSIS — S63.693A OTHER SPRAIN OF LEFT MIDDLE FINGER, INITIAL ENCOUNTER: ICD-10-CM

## 2022-03-31 DIAGNOSIS — M79.642 LEFT HAND PAIN: ICD-10-CM

## 2022-03-31 PROCEDURE — 99213 PR OFFICE/OUTPT VISIT, EST, LEVL III, 20-29 MIN: ICD-10-PCS | Mod: S$GLB,,, | Performed by: NURSE PRACTITIONER

## 2022-03-31 PROCEDURE — 99213 OFFICE O/P EST LOW 20 MIN: CPT | Mod: S$GLB,,, | Performed by: NURSE PRACTITIONER

## 2022-03-31 RX ORDER — IBUPROFEN 200 MG
400 TABLET ORAL EVERY 6 HOURS PRN
Refills: 0 | COMMUNITY
Start: 2022-03-31 | End: 2022-10-04

## 2022-03-31 NOTE — LETTER
Urgent Care - [address] [address]  Phone: [phone]  Fax: [phone]  [facility]: [id number]    Pt Name: Janae Soto  Injury Date: 03/29/2022    Date of First Treatment: 03/31/2022   Company: [employer]      Appointment Time: 11:15 AM Arrived: 1125am   Provider: Melonie Martin NP Time Out:1245pm     Office Treatment:   1. Nondisplaced fracture of middle phalanx of left middle finger, initial encounter for closed fracture    2. Other sprain of left middle finger, initial encounter    3. Sprain of metacarpophalangeal (MCP) joint of left ring finger, initial encounter    4. Left hand pain    5. Encounter related to worker's compensation claim      Medications Ordered This Encounter   Medications    ibuprofen (ADVIL,MOTRIN) 200 MG tablet      Patient Instructions: Attention not to aggravate affected area, Apply ice 24-48 hours then apply heat/warm soaks, Elevated affected area, Use splint as directed    Restrictions: No lifting/pushing/pulling more than 10 lbs, Limited use of left hand and arm     Return Appointment: 04.06.2022 at 10am

## 2022-03-31 NOTE — PROGRESS NOTES
Subjective:       Patient ID: Janae Soto is a 46 y.o. female.    Chief Complaint: Hand Pain (Left)    NV,FALL( DOI:03/29/2022) Tian Ma, . Tripped walking up the stairs and caught herself with her left hand. Went to the school nurse and put iced her hand. The nurse followed up with her the next day and it was swollen and told her to go to [facility] on [address] and the did a X-Ray. She describes the pain as throbbing and numbness. She is taking ibuprofen and putting ice on it. States that the swelling and bruising has gone down. Pain Scale : 4/10     Hand Injury   Her dominant hand is their left hand. The incident occurred 2 days ago. The incident occurred at work. The injury mechanism was a fall. The pain is present in the left fingers. The quality of the pain is described as aching (throbbing). The pain radiates to the left arm. The pain is at a severity of 4/10. The pain is moderate. The pain has been improving since the incident. Associated symptoms include numbness. The symptoms are aggravated by movement and palpation. She has tried ice and NSAIDs for the symptoms. The treatment provided mild relief.       Constitution: Negative.   HENT: Negative.    Neck: neck negative.   Cardiovascular: Negative.    Eyes: Negative.    Respiratory: Negative.    Gastrointestinal: Negative.    Endocrine: negative.   Genitourinary: Negative.    Musculoskeletal: Positive for pain, trauma, joint pain, joint swelling, abnormal ROM of joint and muscle ache.   Skin: Positive for bruising.   Allergic/Immunologic: Negative.    Neurological: Positive for numbness.   Hematologic/Lymphatic: Negative.    Psychiatric/Behavioral: Negative.         Objective:      Physical Exam  Vitals and nursing note reviewed.   Constitutional:       General: She is not in acute distress.     Appearance: Normal appearance. She is well-developed. She is not diaphoretic.   HENT:      Head: Normocephalic and atraumatic.       Right Ear: Ear canal and external ear normal.      Left Ear: Ear canal and external ear normal.      Nose: Nose normal.      Mouth/Throat:      Pharynx: Oropharynx is clear.   Eyes:      General:         Right eye: No discharge.         Left eye: No discharge.      Conjunctiva/sclera: Conjunctivae normal.      Pupils: Pupils are equal, round, and reactive to light.   Cardiovascular:      Rate and Rhythm: Normal rate.      Pulses: Normal pulses.   Pulmonary:      Effort: Pulmonary effort is normal. No respiratory distress.      Breath sounds: No stridor.   Musculoskeletal:         General: Swelling, tenderness and signs of injury present. No deformity.        Hands:       Cervical back: Normal range of motion and neck supple.      Right lower leg: No edema.      Left lower leg: No edema.      Comments: There is bruising, swelling, tenderness to left entire 3rd and 4th digit.  There is decreased ROM secondary to swelling and pain.  There is decreased sensation secondary to swelling and pain.   Lymphadenopathy:      Cervical: No cervical adenopathy.   Skin:     General: Skin is warm and dry.      Capillary Refill: Capillary refill takes less than 2 seconds.      Findings: Bruising present.   Neurological:      Mental Status: She is alert and oriented to person, place, and time.      Cranial Nerves: No cranial nerve deficit.      Sensory: No sensory deficit.      Motor: No abnormal muscle tone.      Coordination: Coordination normal.      Deep Tendon Reflexes: Reflexes normal.   Psychiatric:         Behavior: Behavior normal.         Thought Content: Thought content normal.         Judgment: Judgment normal.       XR HAND COMPLETE 3 VIEW LEFT    Result Date: 3/30/2022  EXAMINATION: XR HAND COMPLETE 3 VIEW LEFT CLINICAL HISTORY: . Pain in left hand TECHNIQUE: PA, lateral, and oblique views of the left hand were performed. COMPARISON: None FINDINGS: Nondisplaced fracture along the volar aspect of the base of the  middle phalanx 3rd digit with surrounding soft tissue edema. Soft tissue edema is seen along the proximal phalanx of the 4th digit with no obvious fracture.  If there is point tenderness, dedicated views of the 4th digit may be considered in further evaluating.     Abnormal study as above. Electronically signed by: Martina Gama Date:    03/30/2022 Time:    13:48  Assessment:       1. Nondisplaced fracture of middle phalanx of left middle finger, initial encounter for closed fracture    2. Other sprain of left middle finger, initial encounter    3. Sprain of metacarpophalangeal (MCP) joint of left ring finger, initial encounter    4. Left hand pain    5. Encounter related to worker's compensation claim        Plan:         Medications Ordered This Encounter   Medications    ibuprofen (ADVIL,MOTRIN) 200 MG tablet     Sig: Take 2 tablets (400 mg total) by mouth every 6 (six) hours as needed for Pain.     Refill:  0     Patient Instructions: Attention not to aggravate affected area, Apply ice 24-48 hours then apply heat/warm soaks, Elevated affected area, Use splint as directed   Restrictions: No lifting/pushing/pulling more than 10 lbs, Limited use of left hand and arm  Follow up in about 6 days (around 4/6/2022).

## 2022-04-08 ENCOUNTER — OFFICE VISIT (OUTPATIENT)
Dept: URGENT CARE | Facility: CLINIC | Age: 47
End: 2022-04-08
Payer: OTHER MISCELLANEOUS

## 2022-04-08 VITALS
OXYGEN SATURATION: 98 % | DIASTOLIC BLOOD PRESSURE: 68 MMHG | HEART RATE: 91 BPM | SYSTOLIC BLOOD PRESSURE: 127 MMHG | RESPIRATION RATE: 16 BRPM | TEMPERATURE: 97 F

## 2022-04-08 DIAGNOSIS — S63.655D SPRAIN OF METACARPOPHALANGEAL (MCP) JOINT OF LEFT RING FINGER, SUBSEQUENT ENCOUNTER: ICD-10-CM

## 2022-04-08 DIAGNOSIS — M79.642 LEFT HAND PAIN: ICD-10-CM

## 2022-04-08 DIAGNOSIS — S62.653D CLOSED NONDISPLACED FRACTURE OF MIDDLE PHALANX OF LEFT MIDDLE FINGER WITH ROUTINE HEALING, SUBSEQUENT ENCOUNTER: ICD-10-CM

## 2022-04-08 DIAGNOSIS — Z02.6 ENCOUNTER RELATED TO WORKER'S COMPENSATION CLAIM: Primary | ICD-10-CM

## 2022-04-08 DIAGNOSIS — S63.613D SPRAIN OF LEFT MIDDLE FINGER, UNSPECIFIED SITE OF DIGIT, SUBSEQUENT ENCOUNTER: ICD-10-CM

## 2022-04-08 PROCEDURE — 99213 PR OFFICE/OUTPT VISIT, EST, LEVL III, 20-29 MIN: ICD-10-PCS | Mod: S$GLB,,, | Performed by: NURSE PRACTITIONER

## 2022-04-08 PROCEDURE — 99213 OFFICE O/P EST LOW 20 MIN: CPT | Mod: S$GLB,,, | Performed by: NURSE PRACTITIONER

## 2022-04-08 NOTE — LETTER
Urgent Care - [address] [address]  Phone: [phone]  Fax: [phone]  [facility]: [id number]    Pt Name: Janae Soto  Injury Date: 03/29/2022    Date of Treatment: 04/08/2022   Company: [employer]      Appointment Time: 08:15 AM Arrived: 820am   Provider: Melonie Martin NP Time Out:920am     Office Treatment:   1. Encounter related to worker's compensation claim    2. Closed nondisplaced fracture of middle phalanx of left middle finger with routine healing, subsequent encounter    3. Sprain of left middle finger, unspecified site of digit, subsequent encounter    4. Sprain of metacarpophalangeal (MCP) joint of left ring finger, subsequent encounter    5. Left hand pain          Patient Instructions: Attention not to aggravate affected area, Apply ice 24-48 hours then apply heat/warm soaks, Elevated affected area, Use splint as directed    Restrictions: Limited use of left hand and arm     Return Appointment: 04.25.2022 at 9am

## 2022-04-08 NOTE — PROGRESS NOTES
Subjective:       Patient ID: Janae Soto is a 46 y.o. female.    Chief Complaint: Finger Injury (Left)    RV,FALL( DOI:03/29/2022) Left finger [employer], . Patient here to follow up on her finger. She said that she woke up and it was feeling better but now it is just feeling sore and slightly numb. Pain scale 7/10. It is not constant pain. She is still taking ibuprofen and it is helping.   SBS    Hand Injury   Her dominant hand is their left hand. The incident occurred more than 1 week ago. The incident occurred at work. The injury mechanism was a fall. The pain is present in the left fingers. The quality of the pain is described as aching. The pain does not radiate. The pain is at a severity of 7/10. The pain is mild. The pain has been intermittent since the incident. Associated symptoms include numbness. The symptoms are aggravated by movement and palpation. She has tried NSAIDs for the symptoms. The treatment provided moderate relief.       Constitution: Negative.   HENT: Negative.    Neck: neck negative.   Cardiovascular: Negative.    Eyes: Negative.    Respiratory: Negative.    Gastrointestinal: Negative.    Endocrine: negative.   Genitourinary: Negative.    Musculoskeletal: Positive for pain, trauma, joint swelling and abnormal ROM of joint.   Skin: Positive for bruising.   Allergic/Immunologic: Negative.    Neurological: Positive for numbness.   Hematologic/Lymphatic: Negative.    Psychiatric/Behavioral: Negative.         Objective:      Physical Exam  Vitals and nursing note reviewed.   Constitutional:       General: She is not in acute distress.     Appearance: Normal appearance. She is well-developed. She is not diaphoretic.   HENT:      Head: Normocephalic and atraumatic.      Right Ear: Ear canal and external ear normal.      Left Ear: Ear canal and external ear normal.      Nose: Nose normal.      Mouth/Throat:      Pharynx: Oropharynx is clear.   Eyes:      General:          Right eye: No discharge.         Left eye: No discharge.      Conjunctiva/sclera: Conjunctivae normal.      Pupils: Pupils are equal, round, and reactive to light.   Cardiovascular:      Rate and Rhythm: Normal rate.      Pulses: Normal pulses.   Pulmonary:      Effort: Pulmonary effort is normal. No respiratory distress.      Breath sounds: No stridor.   Musculoskeletal:         General: Swelling, tenderness and signs of injury present. No deformity.        Hands:       Cervical back: Normal range of motion and neck supple.      Right lower leg: No edema.      Left lower leg: No edema.      Comments: There is bruising, swelling, tenderness to left  4th digit.  There is decreased ROM secondary to swelling and pain.  There is decreased sensation secondary to swelling and pain. Improved over last visit   Lymphadenopathy:      Cervical: No cervical adenopathy.   Skin:     General: Skin is warm and dry.      Capillary Refill: Capillary refill takes less than 2 seconds.      Findings: Bruising present.   Neurological:      Mental Status: She is alert and oriented to person, place, and time.      Cranial Nerves: No cranial nerve deficit.      Sensory: No sensory deficit.      Motor: No abnormal muscle tone.      Coordination: Coordination normal.      Deep Tendon Reflexes: Reflexes normal.   Psychiatric:         Behavior: Behavior normal.         Thought Content: Thought content normal.         Judgment: Judgment normal.         Assessment:       1. Encounter related to worker's compensation claim    2. Closed nondisplaced fracture of middle phalanx of left middle finger with routine healing, subsequent encounter    3. Sprain of left middle finger, unspecified site of digit, subsequent encounter    4. Sprain of metacarpophalangeal (MCP) joint of left ring finger, subsequent encounter    5. Left hand pain        Plan:            Patient Instructions: Attention not to aggravate affected area, Apply ice 24-48 hours then  apply heat/warm soaks, Elevated affected area, Use splint as directed   Restrictions: Limited use of left hand and arm  Follow up in about 17 days (around 4/25/2022).

## 2022-04-13 DIAGNOSIS — Z12.11 COLON CANCER SCREENING: ICD-10-CM

## 2022-04-25 ENCOUNTER — OFFICE VISIT (OUTPATIENT)
Dept: URGENT CARE | Facility: CLINIC | Age: 47
End: 2022-04-25
Payer: OTHER MISCELLANEOUS

## 2022-04-25 VITALS
OXYGEN SATURATION: 99 % | RESPIRATION RATE: 16 BRPM | HEART RATE: 70 BPM | TEMPERATURE: 98 F | SYSTOLIC BLOOD PRESSURE: 141 MMHG | DIASTOLIC BLOOD PRESSURE: 85 MMHG

## 2022-04-25 DIAGNOSIS — M79.642 LEFT HAND PAIN: ICD-10-CM

## 2022-04-25 DIAGNOSIS — S63.655D SPRAIN OF METACARPOPHALANGEAL (MCP) JOINT OF LEFT RING FINGER, SUBSEQUENT ENCOUNTER: ICD-10-CM

## 2022-04-25 DIAGNOSIS — S62.653D CLOSED NONDISPLACED FRACTURE OF MIDDLE PHALANX OF LEFT MIDDLE FINGER WITH ROUTINE HEALING, SUBSEQUENT ENCOUNTER: Primary | ICD-10-CM

## 2022-04-25 DIAGNOSIS — S63.613D SPRAIN OF LEFT MIDDLE FINGER, UNSPECIFIED SITE OF DIGIT, SUBSEQUENT ENCOUNTER: ICD-10-CM

## 2022-04-25 PROCEDURE — 99214 PR OFFICE/OUTPT VISIT, EST, LEVL IV, 30-39 MIN: ICD-10-PCS | Mod: S$GLB,,, | Performed by: NURSE PRACTITIONER

## 2022-04-25 PROCEDURE — 99214 OFFICE O/P EST MOD 30 MIN: CPT | Mod: S$GLB,,, | Performed by: NURSE PRACTITIONER

## 2022-04-25 PROCEDURE — 73130 X-RAY EXAM OF HAND: CPT | Mod: LT,S$GLB,, | Performed by: RADIOLOGY

## 2022-04-25 PROCEDURE — 73130 XR HAND COMPLETE 3 VIEW LEFT: ICD-10-PCS | Mod: LT,S$GLB,, | Performed by: RADIOLOGY

## 2022-04-25 NOTE — PROGRESS NOTES
Subjective:       Patient ID: Janae Soto is a 46 y.o. female.    Chief Complaint: Hand Pain (Left Finger Injury)    RV,FALL( DOI:03/29/2022) Left finger [employer], . Patient is here to follow up on her left finger. She stated that she is not having any pain unless she tries to make a fist or hit it on something. She stated that it looks a little deformed. Pain scale 3/10 describe it as shooting pain. She is not taking any medications.  SBS    Patient states the PIP of the 4th finger is more painful than middle finger. Both still with swelling.     Hand Injury   Her dominant hand is their left hand. The incident occurred more than 1 week ago. The incident occurred at work. The injury mechanism was a fall. The pain is present in the left fingers. The quality of the pain is described as shooting. The pain does not radiate. The pain is at a severity of 3/10. The pain is mild. The pain has been improving since the incident. Nothing aggravates the symptoms. She has tried nothing for the symptoms. The treatment provided significant relief.       Constitution: Negative.   HENT: Negative.    Cardiovascular: Negative.    Respiratory: Negative.    Gastrointestinal: Negative.    Endocrine: negative.   Genitourinary: Negative.  Negative for frequency and urgency.   Musculoskeletal: Negative.    Skin: Negative.    Allergic/Immunologic: Negative.    Neurological: Negative.    Hematologic/Lymphatic: Negative.    Psychiatric/Behavioral: Negative.         Objective:      Physical Exam  Vitals and nursing note reviewed.   Constitutional:       General: She is not in acute distress.     Appearance: Normal appearance. She is well-developed. She is not diaphoretic.   HENT:      Head: Normocephalic and atraumatic.      Right Ear: Ear canal and external ear normal.      Left Ear: Ear canal and external ear normal.      Nose: Nose normal.      Mouth/Throat:      Pharynx: Oropharynx is clear.   Eyes:       General:         Right eye: No discharge.         Left eye: No discharge.      Conjunctiva/sclera: Conjunctivae normal.      Pupils: Pupils are equal, round, and reactive to light.   Cardiovascular:      Rate and Rhythm: Normal rate.      Pulses: Normal pulses.   Pulmonary:      Effort: Pulmonary effort is normal. No respiratory distress.      Breath sounds: No stridor.   Musculoskeletal:         General: Swelling, tenderness and signs of injury present. No deformity.      Left hand: Tenderness present. Decreased range of motion.        Hands:       Cervical back: Normal range of motion and neck supple.      Right lower leg: No edema.      Left lower leg: No edema.      Comments: Improving bruising, swelling, tenderness to left  4th digit. Slight decreased ROM secondary to swelling and pain.  Improved over last visit   Lymphadenopathy:      Cervical: No cervical adenopathy.   Skin:     General: Skin is warm and dry.      Capillary Refill: Capillary refill takes less than 2 seconds.      Findings: Bruising present.   Neurological:      Mental Status: She is alert and oriented to person, place, and time.      Cranial Nerves: No cranial nerve deficit.      Sensory: No sensory deficit.      Motor: No abnormal muscle tone.      Coordination: Coordination normal.      Deep Tendon Reflexes: Reflexes normal.   Psychiatric:         Behavior: Behavior normal.         Thought Content: Thought content normal.         Judgment: Judgment normal.       XR HAND COMPLETE 3 VIEW LEFT    Result Date: 4/25/2022  EXAMINATION: XR HAND COMPLETE 3 VIEW LEFT CLINICAL HISTORY: . Nondisplaced fracture of middle phalanx of left middle finger, subsequent encounter for fracture with routine healing TECHNIQUE: PA, lateral, and oblique views of the left hand were performed. COMPARISON: Left hand radiograph 03/30/2022 FINDINGS: Redemonstrated mild displaced avulsion fracture at the volar aspect of the 3rd digit proximal interphalangeal joint  favored to arise from the volar base of the middle phalanx. No definite additional acute displaced fractures noted elsewhere.  No radiopaque foreign body seen.  Joint spaces appear maintained.     Redemonstrated mildly displaced avulsion fracture of the volar base of the 3rd digit middle phalanx, as seen on 03/30/2022 radiograph. Electronically signed by: Juan Lopez Date:    04/25/2022 Time:    09:47    XR HAND COMPLETE 3 VIEW LEFT    Result Date: 3/30/2022  EXAMINATION: XR HAND COMPLETE 3 VIEW LEFT CLINICAL HISTORY: . Pain in left hand TECHNIQUE: PA, lateral, and oblique views of the left hand were performed. COMPARISON: None FINDINGS: Nondisplaced fracture along the volar aspect of the base of the middle phalanx 3rd digit with surrounding soft tissue edema. Soft tissue edema is seen along the proximal phalanx of the 4th digit with no obvious fracture.  If there is point tenderness, dedicated views of the 4th digit may be considered in further evaluating.     Abnormal study as above. Electronically signed by: Martina Gama Date:    03/30/2022 Time:    13:4  Assessment:       1. Closed nondisplaced fracture of middle phalanx of left middle finger with routine healing, subsequent encounter    2. Sprain of left middle finger, unspecified site of digit, subsequent encounter    3. Sprain of metacarpophalangeal (MCP) joint of left ring finger, subsequent encounter    4. Left hand pain        Plan:     overall improving. Still with some swelling to area. Advised to use a tennis ball for slight  exercises will see back in 4 weeks for re evaluation     Patient Instructions: Daily home exercises/warm soaks, Attention not to aggravate affected area   Restrictions: Limited use of left hand and arm  Follow up in about 4 weeks (around 5/23/2022).

## 2022-04-25 NOTE — LETTER
Urgent Care - [address] [address]  Phone: [phone]  Fax: [phone]  [facility]: [id number]    Pt Name: Janae Soto  Injury Date: 03/29/2022   Employee ID: [id number] Date of Treatment: 04/25/2022   Company:[employer]      Appointment Time: 09:00 AM Arrived: 08:55 AM   Provider: WALTER Perez Time Out:10:07 AM     Office Treatment:   1. Closed nondisplaced fracture of middle phalanx of left middle finger with routine healing, subsequent encounter    2. Sprain of left middle finger, unspecified site of digit, subsequent encounter    3. Sprain of metacarpophalangeal (MCP) joint of left ring finger, subsequent encounter    4. Left hand pain          Patient Instructions: Daily home exercises/warm soaks, Attention not to aggravate affected area    Restrictions: Limited use of left hand and arm     Return Appointment: 5/23/2022 at 09:00 AM

## 2022-05-23 ENCOUNTER — TELEPHONE (OUTPATIENT)
Dept: URGENT CARE | Facility: CLINIC | Age: 47
End: 2022-05-23
Payer: COMMERCIAL

## 2022-05-23 ENCOUNTER — OFFICE VISIT (OUTPATIENT)
Dept: URGENT CARE | Facility: CLINIC | Age: 47
End: 2022-05-23
Payer: OTHER MISCELLANEOUS

## 2022-05-23 VITALS
HEIGHT: 67 IN | SYSTOLIC BLOOD PRESSURE: 116 MMHG | RESPIRATION RATE: 16 BRPM | BODY MASS INDEX: 24.96 KG/M2 | HEART RATE: 74 BPM | OXYGEN SATURATION: 95 % | DIASTOLIC BLOOD PRESSURE: 76 MMHG | WEIGHT: 159 LBS

## 2022-05-23 DIAGNOSIS — S63.655D SPRAIN OF METACARPOPHALANGEAL (MCP) JOINT OF LEFT RING FINGER, SUBSEQUENT ENCOUNTER: ICD-10-CM

## 2022-05-23 DIAGNOSIS — M79.642 LEFT HAND PAIN: ICD-10-CM

## 2022-05-23 DIAGNOSIS — S62.653D CLOSED NONDISPLACED FRACTURE OF MIDDLE PHALANX OF LEFT MIDDLE FINGER WITH ROUTINE HEALING, SUBSEQUENT ENCOUNTER: ICD-10-CM

## 2022-05-23 DIAGNOSIS — Z02.6 ENCOUNTER RELATED TO WORKER'S COMPENSATION CLAIM: Primary | ICD-10-CM

## 2022-05-23 DIAGNOSIS — S63.613D SPRAIN OF LEFT MIDDLE FINGER, UNSPECIFIED SITE OF DIGIT, SUBSEQUENT ENCOUNTER: ICD-10-CM

## 2022-05-23 PROCEDURE — 99214 PR OFFICE/OUTPT VISIT, EST, LEVL IV, 30-39 MIN: ICD-10-PCS | Mod: S$GLB,,, | Performed by: NURSE PRACTITIONER

## 2022-05-23 PROCEDURE — 99214 OFFICE O/P EST MOD 30 MIN: CPT | Mod: S$GLB,,, | Performed by: NURSE PRACTITIONER

## 2022-05-23 NOTE — LETTER
Urgent Care - [address] [address]  Phone: [phone]  Fax: [phone]  [facility]: [id number]    Pt Name: Janae Soto  Injury Date: 03/29/2022   Employee ID: [id number] Date of Treatment: 05/23/2022   Company: [employer]      Appointment Time: 09:00 AM Arrived: 08:53 AM   Provider: WALTER Perez Time Out: 10:07 AM     Office Treatment:   1. Encounter related to worker's compensation claim    2. Closed nondisplaced fracture of middle phalanx of left middle finger with routine healing, subsequent encounter    3. Sprain of left middle finger, unspecified site of digit, subsequent encounter    4. Sprain of metacarpophalangeal (MCP) joint of left ring finger, subsequent encounter    5. Left hand pain          Patient Instructions: PT to be scheduled once authorized, Referral to specialist to be scheduled, once authorized, Daily home exercises/warm soaks, Attention not to aggravate affected area, Use splint as directed    Restrictions: Limited use of right hand and arm     Return Appointment: 6/15/2022 at 09:00 AM    BG

## 2022-05-23 NOTE — PROGRESS NOTES
Subjective:       Patient ID: Janae Soto is a 46 y.o. female.    Chief Complaint: Hand Injury    RV,FALL( DOI:03/29/2022) Left finger [employer]- Patient is here do a follow on her left ring and middle finger. Patient would like another X-ray to be perform on her hand. Patient still having some swelling to her middle and ring finger. Patient has been dipping her hand in ice this weekend to help with swelling. Pain level is 0 if she doesn't hit it or make a fist. If she try to close her hand the pain level is 3/10 on today. She is not taking any medication. Patient is using a tennis ball through out the day to help. If she have pain its dull the comes and go. Denies any numbness or tingling. BG              Hand Injury   Her dominant hand is their left hand. The incident occurred more than 1 week ago. The incident occurred at work. The injury mechanism was a fall. The pain is present in the left fingers. The quality of the pain is described as shooting. The pain does not radiate. The pain is at a severity of 3/10. The pain is mild. The pain has been improving since the incident. Pertinent negatives include no chest pain, muscle weakness, numbness or tingling. The symptoms are aggravated by movement. She has tried ice for the symptoms. The treatment provided mild relief.       Constitution: Negative.   HENT: Negative.    Cardiovascular: Negative.  Negative for chest pain.   Respiratory: Negative.    Gastrointestinal: Negative.    Endocrine: negative.   Genitourinary: Negative.  Negative for frequency and urgency.   Musculoskeletal: Positive for pain, joint swelling and abnormal ROM of joint.   Skin: Negative.  Negative for bruising.   Allergic/Immunologic: Negative.    Neurological: Negative.  Negative for numbness and tingling.   Hematologic/Lymphatic: Negative.    Psychiatric/Behavioral: Negative.         Objective:      Physical Exam  Vitals and nursing note reviewed.   Constitutional:       General: She  is not in acute distress.     Appearance: Normal appearance. She is well-developed. She is not diaphoretic.   HENT:      Head: Normocephalic and atraumatic.      Right Ear: Ear canal and external ear normal.      Left Ear: Ear canal and external ear normal.      Nose: Nose normal.      Mouth/Throat:      Pharynx: Oropharynx is clear.   Eyes:      General:         Right eye: No discharge.         Left eye: No discharge.      Conjunctiva/sclera: Conjunctivae normal.      Pupils: Pupils are equal, round, and reactive to light.   Cardiovascular:      Rate and Rhythm: Normal rate.      Pulses: Normal pulses.   Pulmonary:      Effort: Pulmonary effort is normal. No respiratory distress.      Breath sounds: No stridor.   Musculoskeletal:         General: Swelling, tenderness and signs of injury present. No deformity.      Left hand: Tenderness present. Decreased range of motion.        Hands:       Cervical back: Normal range of motion and neck supple.      Right lower leg: No edema.      Left lower leg: No edema.      Comments: Still with slight flexion deformity about 2- degrees to middle and ring fingers   Lymphadenopathy:      Cervical: No cervical adenopathy.   Skin:     General: Skin is warm and dry.      Capillary Refill: Capillary refill takes less than 2 seconds.      Findings: No bruising.   Neurological:      Mental Status: She is alert and oriented to person, place, and time.      Cranial Nerves: No cranial nerve deficit.      Sensory: No sensory deficit.      Motor: No abnormal muscle tone.      Coordination: Coordination normal.      Deep Tendon Reflexes: Reflexes normal.   Psychiatric:         Behavior: Behavior normal.         Thought Content: Thought content normal.         Judgment: Judgment normal.         Assessment:       1. Encounter related to worker's compensation claim    2. Closed nondisplaced fracture of middle phalanx of left middle finger with routine healing, subsequent encounter    3. Sprain  of left middle finger, unspecified site of digit, subsequent encounter    4. Sprain of metacarpophalangeal (MCP) joint of left ring finger, subsequent encounter    5. Left hand pain        Plan:     will get mri with continued deformity and tenderness of fingers  Will refer to hand for consult  Will order pt   Patient placed in splints     She is going on vacation so will do a 3 week follow up.        Patient Instructions: PT to be scheduled once authorized, Referral to specialist to be scheduled, once authorized, Daily home exercises/warm soaks, Attention not to aggravate affected area, Use splint as directed   Restrictions: Limited use of right hand and arm  Follow up in about 23 days (around 6/15/2022).

## 2022-05-24 NOTE — TELEPHONE ENCOUNTER
Patient called with questions about referral. Patient is going out of town so trying to get appt. Scheduled prior to that

## 2022-05-26 ENCOUNTER — TELEPHONE (OUTPATIENT)
Dept: ORTHOPEDICS | Facility: CLINIC | Age: 47
End: 2022-05-26
Payer: COMMERCIAL

## 2022-05-26 DIAGNOSIS — R52 PAIN: Primary | ICD-10-CM

## 2022-05-26 NOTE — TELEPHONE ENCOUNTER
Spoke with pt in regards to scheduling approved WC appt. I offered pt an appt for trrw with Sis, pt stated she cannot come in due to work and she will not be able to attend any appts until the 3rd week of June due to vacation purposes. Luann scheduled pt for 6/16 1pm xray and 1:30pm NP appt with Sis. Pt verbalized understanding.

## 2022-05-30 ENCOUNTER — PATIENT MESSAGE (OUTPATIENT)
Dept: ADMINISTRATIVE | Facility: HOSPITAL | Age: 47
End: 2022-05-30
Payer: COMMERCIAL

## 2022-06-08 ENCOUNTER — PATIENT MESSAGE (OUTPATIENT)
Dept: ORTHOPEDICS | Facility: CLINIC | Age: 47
End: 2022-06-08
Payer: COMMERCIAL

## 2022-06-20 ENCOUNTER — HOSPITAL ENCOUNTER (OUTPATIENT)
Dept: RADIOLOGY | Facility: OTHER | Age: 47
Discharge: HOME | End: 2022-06-20
Attending: INTERNAL MEDICINE
Payer: COMMERCIAL

## 2022-06-20 DIAGNOSIS — Z12.31 OTHER SCREENING MAMMOGRAM: ICD-10-CM

## 2022-06-20 PROCEDURE — 77063 BREAST TOMOSYNTHESIS BI: CPT | Mod: TC

## 2022-06-20 PROCEDURE — 77067 SCR MAMMO BI INCL CAD: CPT | Mod: 26,,, | Performed by: RADIOLOGY

## 2022-06-20 PROCEDURE — 77067 SCR MAMMO BI INCL CAD: CPT | Mod: TC

## 2022-06-20 PROCEDURE — 77063 MAMMO DIGITAL SCREENING BILAT WITH TOMO: ICD-10-PCS | Mod: 26,,, | Performed by: RADIOLOGY

## 2022-06-20 PROCEDURE — 77063 BREAST TOMOSYNTHESIS BI: CPT | Mod: 26,,, | Performed by: RADIOLOGY

## 2022-06-20 PROCEDURE — 77067 MAMMO DIGITAL SCREENING BILAT WITH TOMO: ICD-10-PCS | Mod: 26,,, | Performed by: RADIOLOGY

## 2022-06-23 ENCOUNTER — HOSPITAL ENCOUNTER (OUTPATIENT)
Dept: RADIOLOGY | Facility: OTHER | Age: 47
Discharge: HOME | End: 2022-06-23
Attending: NURSE PRACTITIONER
Payer: OTHER MISCELLANEOUS

## 2022-06-23 DIAGNOSIS — S63.655D SPRAIN OF METACARPOPHALANGEAL (MCP) JOINT OF LEFT RING FINGER, SUBSEQUENT ENCOUNTER: ICD-10-CM

## 2022-06-23 DIAGNOSIS — S62.653D CLOSED NONDISPLACED FRACTURE OF MIDDLE PHALANX OF LEFT MIDDLE FINGER WITH ROUTINE HEALING, SUBSEQUENT ENCOUNTER: ICD-10-CM

## 2022-06-23 DIAGNOSIS — M79.642 LEFT HAND PAIN: ICD-10-CM

## 2022-06-23 DIAGNOSIS — S63.613D SPRAIN OF LEFT MIDDLE FINGER, UNSPECIFIED SITE OF DIGIT, SUBSEQUENT ENCOUNTER: ICD-10-CM

## 2022-06-23 PROCEDURE — 73218 MRI HAND FINGERS WITHOUT CONTRAST LEFT: ICD-10-PCS | Mod: 26,LT,, | Performed by: RADIOLOGY

## 2022-06-23 PROCEDURE — 73218 MRI UPPER EXTREMITY W/O DYE: CPT | Mod: TC,LT

## 2022-06-23 PROCEDURE — 73218 MRI UPPER EXTREMITY W/O DYE: CPT | Mod: 26,LT,, | Performed by: RADIOLOGY

## 2022-06-28 ENCOUNTER — OFFICE VISIT (OUTPATIENT)
Dept: ORTHOPEDICS | Facility: CLINIC | Age: 47
End: 2022-06-28
Payer: OTHER MISCELLANEOUS

## 2022-06-28 ENCOUNTER — HOSPITAL ENCOUNTER (OUTPATIENT)
Dept: RADIOLOGY | Facility: OTHER | Age: 47
Discharge: HOME | End: 2022-06-28
Attending: PHYSICIAN ASSISTANT
Payer: COMMERCIAL

## 2022-06-28 VITALS
DIASTOLIC BLOOD PRESSURE: 75 MMHG | SYSTOLIC BLOOD PRESSURE: 115 MMHG | HEIGHT: 67 IN | WEIGHT: 158.94 LBS | BODY MASS INDEX: 24.94 KG/M2 | HEART RATE: 76 BPM

## 2022-06-28 DIAGNOSIS — R52 PAIN: ICD-10-CM

## 2022-06-28 DIAGNOSIS — M79.645 FINGER PAIN, LEFT: Primary | ICD-10-CM

## 2022-06-28 PROCEDURE — 99999 PR PBB SHADOW E&M-EST. PATIENT-LVL III: CPT | Mod: PBBFAC,,, | Performed by: ORTHOPAEDIC SURGERY

## 2022-06-28 PROCEDURE — 99204 OFFICE O/P NEW MOD 45 MIN: CPT | Mod: S$GLB,,, | Performed by: ORTHOPAEDIC SURGERY

## 2022-06-28 PROCEDURE — 99204 PR OFFICE/OUTPT VISIT, NEW, LEVL IV, 45-59 MIN: ICD-10-PCS | Mod: S$GLB,,, | Performed by: ORTHOPAEDIC SURGERY

## 2022-06-28 PROCEDURE — 73130 X-RAY EXAM OF HAND: CPT | Mod: TC,FY,LT

## 2022-06-28 PROCEDURE — 99999 PR PBB SHADOW E&M-EST. PATIENT-LVL III: ICD-10-PCS | Mod: PBBFAC,,, | Performed by: ORTHOPAEDIC SURGERY

## 2022-06-28 PROCEDURE — 73130 XR HAND COMPLETE 3 VIEW LEFT: ICD-10-PCS | Mod: 26,LT,, | Performed by: RADIOLOGY

## 2022-06-28 PROCEDURE — 73130 X-RAY EXAM OF HAND: CPT | Mod: 26,LT,, | Performed by: RADIOLOGY

## 2022-06-28 NOTE — PROGRESS NOTES
I have personally taken the history and examined this patient. I agree with the resident's note as stated above.       46 y.o. yo female with left long and ring finger volar plate injuries which have developed stiffness and boutonierre deformity     Plan: CHT ordered. Patient to begin aggressive therapy. F/u in 8 weeks with PA.    Patient had injury back in March this is workman's comp she never had therapy or appropriate splinting we discussed this with our hand therapist she will start hand therapy to see if we can regain some full extension I discussed with the patient that this is almost like a coaches injury where they have a finger sprain and treated without therapy or splinting and then the develop a boutonniere sometimes these can be permanent right this point no surgical options are necessary

## 2022-06-28 NOTE — PROGRESS NOTES
Orthopedics    SUBJECTIVE:        Chief Complaint: left ring and index finger deformity    Referring Provider:      History of Present Illness:  Patient is a 46 y.o. left hand dominant female who presents today with complaints of left ring and long finger deformity. She fell at work on 3/29/22 and had immediate pain of the left ring and long fingers at the pip joint. She was seen by urgent care a day later and then by workers comp approved provider who placed her into a splint. Per the patient, after disappointment in her progress her provider transitioned her to a finger extension splint. Patient reports continued finger pain and deformity and stiffness. Has not been doing therapy to this point.            Past Medical History:   Diagnosis Date    Asthma      Past Surgical History:   Procedure Laterality Date    SINUS SURGERY       Review of patient's allergies indicates:  No Known Allergies  Social History     Social History Narrative    Not on file     Family History   Problem Relation Age of Onset    Asthma Mother     No Known Problems Father     Diabetes Paternal Aunt     Asthma Maternal Grandfather     Cancer Neg Hx          Current Outpatient Medications:     albuterol (PROVENTIL) 2.5 mg /3 mL (0.083 %) nebulizer solution, Take 3 mLs (2.5 mg total) by nebulization every 6 (six) hours as needed for Wheezing. Rescue, Disp: 1 Box, Rfl: 1    albuterol (PROVENTIL/VENTOLIN HFA) 90 mcg/actuation inhaler, Inhale 2 puffs into the lungs every 4 (four) hours as needed for Wheezing., Disp: 18 g, Rfl: 1    albuterol (VENTOLIN HFA) 90 mcg/actuation inhaler, Inhale 2 puffs into the lungs every 6 (six) hours as needed for Wheezing. Rescue, Disp: 18 g, Rfl: 0    fexofenadine-pseudoephedrine (ALLEGRA-D 24) 180-240 mg per 24 hr tablet, Take 1 tablet by mouth once daily., Disp: , Rfl:     ibuprofen (ADVIL,MOTRIN) 200 MG tablet, Take 2 tablets (400 mg total) by mouth every 6 (six) hours as needed for Pain., Disp: ,  "Rfl: 0    norgestimate-ethinyl estradioL (ORTHO TRI-CYCLEN LO, 28,) 0.18/0.215/0.25 mg-25 mcg tablet, Take 1 tablet by mouth once daily., Disp: 90 tablet, Rfl: 3  No current facility-administered medications for this visit.    Facility-Administered Medications Ordered in Other Visits:     sodium chloride 0.9% bolus 1,000 mL, 1,000 mL, Intravenous, Once, Svitlana Alcala NP      Review of Systems:  Review of Systems   Constitution: Negative for chills and fever.   HENT: Negative for ear discharge and ear pain.    Eyes: Negative for double vision and pain.   Cardiovascular: Negative for cyanosis and dyspnea on exertion.   Respiratory: Negative for cough and shortness of breath.    Endocrine: Negative for cold intolerance and heat intolerance.   Skin: Negative for itching and rash.   Musculoskeletal:        See HPI   Gastrointestinal: Negative for constipation and diarrhea.   Genitourinary: Negative for flank pain and frequency.   Neurological: Negative for excessive daytime sleepiness and focal weakness.     OBJECTIVE:      Vital Signs (Most Recent):  Vitals:    06/28/22 1054   BP: 115/75   Pulse: 76   Weight: 72.1 kg (158 lb 15.2 oz)   Height: 5' 7" (1.702 m)     Body mass index is 24.9 kg/m².      Physical Exam:  Constitutional: The patient appears well-developed and well-nourished. No distress.   Skin: No lesions appreciated  Head: Normocephalic and atraumatic.   Nose: Nose normal.   Ears: No deformities seen  Eyes: Conjunctivae and EOM are normal.   Neck: No tracheal deviation present.   Cardiovascular: Normal rate and intact distal pulses.    Pulmonary/Chest: Effort normal. No respiratory distress.   Abdominal: There is no guarding.   Neurological: The patient is alert.   Psychiatric: The patient has a normal mood and affect.     LUE: Skin intact, no deformity noted. No open wounds/abrasions/laceration. Mild TTP left ring and long finger PIP joints. Patient lacks terminal 15 degrees extension at the PIP joint " with hyper extension deformity at the dip which is supple in  in both ring and index finger FROM shoulder, elbow and wrist. SILT M/U/R. Motor intact AIN/PIN/M/U/R. Cap refill < 2s. 2+ RP      Diagnostic Results:     Imaging - I independently viewed the patient's imaging as well as the radiology report.  Xrays of the patient's left hand demonstrate bony avulsion from volar middle phalanx of the long finger at the pip joint with no other fractures or dislocations or significant degenerative changes.    EMG - none    ASSESSMENT/PLAN:      46 y.o. yo female with left long and ring finger volar plate injuries which have developed stiffness and boutonierre deformity    Plan: CHT ordered. Patient to begin aggressive therapy. F/u in 8 weeks with PA.

## 2022-06-29 DIAGNOSIS — M20.029 BOUTONNIERE DEFORMITY, UNSPECIFIED LATERALITY: Primary | ICD-10-CM

## 2022-06-30 ENCOUNTER — CLINICAL SUPPORT (OUTPATIENT)
Dept: REHABILITATION | Facility: HOSPITAL | Age: 47
End: 2022-06-30
Payer: OTHER MISCELLANEOUS

## 2022-06-30 DIAGNOSIS — R29.898 DECREASED GRIP STRENGTH OF LEFT HAND: ICD-10-CM

## 2022-06-30 DIAGNOSIS — M25.60 RANGE OF MOTION DEFICIT: ICD-10-CM

## 2022-06-30 DIAGNOSIS — M20.029 BOUTONNIERE DEFORMITY, UNSPECIFIED LATERALITY: ICD-10-CM

## 2022-06-30 DIAGNOSIS — M20.002 FINGER DEFORMITY, ACQUIRED, LEFT: ICD-10-CM

## 2022-06-30 DIAGNOSIS — M79.645 FINGER PAIN, LEFT: ICD-10-CM

## 2022-06-30 PROCEDURE — 97165 OT EVAL LOW COMPLEX 30 MIN: CPT

## 2022-06-30 PROCEDURE — 97018 PARAFFIN BATH THERAPY: CPT | Mod: 59

## 2022-06-30 PROCEDURE — 97110 THERAPEUTIC EXERCISES: CPT

## 2022-06-30 NOTE — PLAN OF CARE
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    Boutonniere deformity, unspecified laterality     Range of motion deficit     Decreased  strength of left hand     Finger deformity, acquired, left        Physician: Fan Martin, *; Dr. PHIL Nichols     Physician Orders:    Left hand volar plate injury 3/29/22 Dr. Curran would like seen asap. Developing boutonniere deformity.        Surgical Procedure and Date: n/a, n/a     Evaluation Date: 6/30/2022    Plan of Care Certification Period: 8/30/2022      Date of Return to MD / Progress Note due : 8 weeks     Visit # / Visits authorized: 1/8 pending   Insurance Authorization Period Expiration: Work Comp     FOTO: unavailable     Precautions:  Standard    Time In:9  Time Out: 10  Total Appointment Time (timed & untimed codes): 60 minutes    SUBJECTIVE   Involved Side: left   Dominant Side: left     Date of Onset: 3/29/2022     History of Current Condition/Mechanism of Injury: Viki reports: fell at work with immediate pain in left ring and middle finger at PIP joint. Went to urgent care and placed in splint. Monitored by DICKSON MD for 8 weeks and referred to hand surgeon with Continued finger pain, deformity and stiffness.   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     Current Functional Status   Home/Living environment: lives with their family      Limitation of Functional Status as follows:   ADLs/IADLs:     - Feeding: IND     - Bathing: IND     - Dressing/Grooming: limited L hand use for hair care     - Driving: limited  for driving     - /pinch :limited  and 3pt pinch for FM activities, writing, typing,     - Household chores/ activities: limited /pinch for lifting, carrying, opening things, cooking, cleaning.      Leisure: gym workouts       Medical History:   Past Medical History:   Diagnosis Date    Asthma        Surgical History:    has a past surgical history that includes Sinus surgery.    Medications:   has a current medication list which includes the following prescription(s): albuterol, albuterol, albuterol, fexofenadine-pseudoephedrine, ibuprofen, and norgestimate-ethinyl estradiol, and the following Facility-Administered Medications: sodium chloride 0.9%.    Allergies:   Review of patient's allergies indicates:  No Known Allergies       OBJECTIVE     Observation/Appearance:  Joint stiffness, Deformities noted: boutonierre deformities to Left MF/RF, Joint laxity and Hypermobility       Sensation:   Intact     Wound/Scar:   None     Edema. Measured in centimeters.    Middle / ring   PP 5.4 / 5.4   PIP 5.5 / 5.5   MP 5.0/ 4.5   DIP 4.4 / 3.9   DP 3.9 / 3.3        Hand ROM. Measured in degrees.      MIDDLE / RING   MP +25 / 94   +25/95  PIP 20/ 77  16/82   DIP +10/52  +12/ 45     ANDREWS 238 / 243      Manual Muscle Testing   FDP, FDS 3-/5   EDC 3-/5       Special Tests:  NT         Strength (Dyanmometer) and Pinch Strength (Pinch Gauge)  Measured in pounds and psi. Average of three trials.     To be assessed at 6-8 weeks post op      6/30/2022  "6/30/2022    RIGHT LEFT    Rung II TBA TBA   Key Pinch NT NT   3pt Pinch TBA TBA   2pt Pinch NT         NT         NO FOTO     Treatment   Total Treatment time (time-based codes) separate from Evaluation: 25 minutes    Viki received the treatments listed below:     Supervised modalities after being cleared for contradictions for 10 minutes:   -Patient received paraffin bath to left  hand  for 10 minutes to increase blood flow, circulation, pain management and for tissue elasticity prior to therex.     Therapeutic exercises for 15 minutes including:  -blocking FDP, FDS, reverse blocking PIP, isolated FDS glide, hook/fist (with and without buddy tape), flat fist, wave, "place and hold" for digit extension x 10 reps each, 3-5 x daily   - modality use for pain, stiffness, swelling.   - no orthosis at this time, will consider LMB as needed for extension lag     Home Exercise Program/Education:  Issued HEP (see patient instructions in EMR) and educated on  use of hot/cold modality use for pain, stiffness and edema management . Exercises were reviewed and Viki was able to demonstrate them prior to the end of the session.   Pt received a written copy of exercises to perform at home. Viki demonstrated good  understanding of the education provided.  Pt was advised to perform these exercises with minimal pain/discomfort of 3-4 out of 10 at worse, discontinue if pain worsens.    Patient/Family Education: role of OT, goals for OT, scheduling/cancellations - pt verbalized understanding. Discussed insurance limitations with patient.    Additional Education provided: per above    Patient Education and Home Exercises      Written Home Exercises Provided: yes.  Exercises were reviewed and Viki was able to demonstrate them prior to the end of the session.  Viki demonstrated good  understanding of the education provided. See EMR under Patient Instructions for exercises provided during therapy sessions.     Pt was advised to " perform these exercises free of pain, and to stop performing them if pain occurs.    Patient/Family Education: role of OT, goals for OT, scheduling/cancellations - pt verbalized understanding. Discussed insurance limitations with patient.      ASSESSMENT     Janae Soto is a 46 y.o. female referred to outpatient occupational therapy and presents with a medical diagnosis of LEFT MIDDLE AND RING BOUTONIERRE DEFORMITY .  Patient presents with the following therapy deficits: Decreased ROM, Decreased  strength, Decreased pinch strength, Decreased muscle strength, Decreased functional hand use, Increased pain, Edema and Joint Stiffness and demonstrates limitations as described in the chart below. Following medical record review it is determined that pt will benefit from occupational therapy services in order to maximize pain free and/or functional use of left hand.    The following goals were discussed with the patient and patient is in agreement with them as to be addressed in the treatment plan. The patient's rehab potential is Excellent.     Anticipated barriers to occupational therapy: none   Pt has no cultural, educational or language barriers to learning provided.    Profile and History Assessment of Occupational Performance Level of Clinical Decision Making Complexity Score   Occupational Profile:   Janae Soto is a 46 y.o. female who lives with their family and is currently employed Janae Soto has difficulty with  ADLs and IADLs as listed previously, which  Affecting herdaily functional abilities.      Comorbidities:    has a past medical history of Asthma.    Medical and Therapy History Review:   Brief               Performance Deficits    Physical:  Joint Mobility  Joint Stability  Muscle Power/Strength  Muscle Endurance  Edema   Strength  Pinch Strength  Gross Motor Coordination  Fine Motor Coordination  Pain    Cognitive:  No Deficits    Psychosocial:    Habits  Routines     Clinical  Decision Making:  low    Assessment Process:  Problem-Focused Assessments    Modification/Need for Assistance:  Not Necessary    Intervention Selection:  Limited Treatment Options       low  Based on PMHX, co morbidities , data from assessments and functional level of assistance required with task and clinical presentation directly impacting function.       The following goals were discussed with the patient and patient is in agreement with them as to be addressed in the treatment plan.     Goals:   Short Term Goals (4 weeks)   1)  Patient to be IND with HEP and modalities for pain management  2)  Increase ROM 3-10 degrees to increase functional hand use for ADLs/work/leisure activities  3)   Decrease edema .1-.5mm to increase joint mobility /flexibility for functional hand use.   4)  Assess  and  3 PT pinch and set goals accordingly         Long Term Goals (8 weeks)   1)  Increase  strength 2-8 lbs. For driving, gym workouts   2)  Increase 3 PT pinch strength 1-4 psi's for writing, typing and FM activities   3)  Increase ROM 11-20 degrees to increase functional hand use for ADLs/work/leisure activities      Plan   Recommend continued Outpatient Occupataional Therapy  1x week for 8 weeks to include the following interventions Paraffin, Manual therapy/joint mobilizations, Modalities for pain management, US 3 mhz, Therapeutic exercises/activities., Strengthening, Edema Control, Scar Management, Wound Care and Electrical Modalities.    Within the Plan of Care Certification: 6/30/2022 to 8/30/2022.       I CERTIFY THE NEED FOR THESE SERVICES FURNISHED UNDER THIS PLAN OF TREATMENT AND WHILE UNDER MY CARE  Physician's comments:      Physician's Signature: ___________________________________________________      Therapist's Name: KARMEN Cartagena, SHARITAT

## 2022-06-30 NOTE — PATIENT INSTRUCTIONS
"[facility] - OCCUPATIONAL THERAPY  HOME EXERCISE PROGRAM   Soak hand in hot water or use hot wet compress for 5-10 min before exercises or in the morning to decrease stiffness.     Cold pack x 10 min as needed for pain, swelling at night     Complete the following exercises with 10 repetitions each, 3-5x/day.     AROM: DIP Flexion / Extension  Pinch middle knuckle to prevent bending. Bend end knuckle until stretch is felt.   Hold 3 seconds. Relax. Straighten finger as far as possible.    AROM: PIP Flexion / Extension  Pinch bottom knuckle  to prevent bending. Actively bend middle knuckle until stretch is felt.   Hold 3 seconds. Relax. Straighten finger as far as possible.    Reverse block- hold behind the middle joint and bend your knuckle - then try to push your finger straigh.     AROM: Isolated PIP Flexion  Bend only middle joint of your finger, keeping other fingers straight with other hand.    AROM: Isolated MCP Flexion / Extension ("Wave")   Bend only your large, bottom knuckles. Hold 3 seconds. Keep the tips of your fingers straight. Straighten fingers.    AROM: Isolated IPJ Flexion / Extension ("Hook")  Bend only your middle and end knuckles. Hold 3 seconds.   Straighten your fingers.     AROM: MCP and PIP Flexion / Extension ("Straight Fist")  Bend your bottom and middle knuckles, keeping the tips of your fingers straight. Try to touch the pads of your fingers on your palm. Hold 3 seconds. Straighten your fingers.     AROM: Composite Flexion / Extension ("Full Fist")  Bend every joint in your hand into a fist. Hold 3 seconds. Straighten your fingers.     AROM: Composte Extension ("Finger Lifts")  PLace and hold - Lift your finger off of the table one at a time. Hold 3 seconds. Relax your finger.        Copyright © [employer]. All rights reserved.     Therapist: KARMEN Bustos, JOVANNY     "

## 2022-07-18 ENCOUNTER — CLINICAL SUPPORT (OUTPATIENT)
Dept: REHABILITATION | Facility: HOSPITAL | Age: 47
End: 2022-07-18
Payer: OTHER MISCELLANEOUS

## 2022-07-18 DIAGNOSIS — R29.898 DECREASED GRIP STRENGTH OF LEFT HAND: ICD-10-CM

## 2022-07-18 DIAGNOSIS — M25.60 RANGE OF MOTION DEFICIT: Primary | ICD-10-CM

## 2022-07-18 DIAGNOSIS — M20.002 FINGER DEFORMITY, ACQUIRED, LEFT: ICD-10-CM

## 2022-07-18 PROCEDURE — 97110 THERAPEUTIC EXERCISES: CPT

## 2022-07-18 PROCEDURE — 97018 PARAFFIN BATH THERAPY: CPT

## 2022-07-18 NOTE — PROGRESS NOTES
[facility]  Occupational Therapy Treatment Note    Date: 7/18/2022  Name: Janae Soto  Clinic Number: [id number]    Therapy Diagnosis:   Encounter Diagnoses   Name Primary?    Range of motion deficit Yes    Decreased  strength of left hand     Finger deformity, acquired, left      Physician: Stephie Barrera PA Dr. L. 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   She was compliant with home exercise program given last session.   Response to previous treatment: Good - improving digit flexion   Functional change: none noted yet     Pain: 0/10  Location: left ring and long fingers       OBJECTIVE   Objective Measures updated at progress report unless specified.    Observation/Appearance:  Joint stiffness, Deformities noted: boutonierre deformities to Left MF/RF, Joint laxity and Hypermobility         Sensation:   Intact      Wound/Scar:   None      Edema. Measured in centimeters.               Middle / ring   PP 5.4 / 5.4   PIP 5.5 / 5.5   MP 5.0/ 4.5   DIP 4.4 / 3.9   DP 3.9 / 3.3       Hand ROM. Measured in degrees.                  MIDDLE / RING (6/30/22)  MP +25 / 94                 +25/95  PIP 20/ 77                   16/82   DIP +10/52                  +12/ 45      ANDREWS 238         /           243     MIDDLE / RING (7/18/2022)  MP +25 / 95                 +25/95  PIP  -15/ 84                  -20/80  DIP +10/65                  0/55     ANDREWS 264 (+26)    /           235 (-8)     Manual Muscle Testing   FDP, FDS 3-/5   EDC 3-/5      Special Tests:  NT      Strength (Dyanmometer) and Pinch Strength (Pinch Gauge)  Measured in pounds and psi. Average of three trials.     · To be assessed at 6-8 weeks post injury       6/30/2022 6/30/2022     RIGHT LEFT    Rung II TBA TBA   Key Pinch NT NT   3pt Pinch TBA TBA   2pt Pinch NT         NT        Treatment     Viki received the treatments listed below:     Supervised modalities after being cleared for contradictions: Paraffin bath - with moist heat pack to L hand(s) in extension stretch for 10 minutes to increase blood flow, circulation, pain management, and for tissue elasticity prior to therex.     Manual therapy techniques: Joint mobilizations, Manual Lymphatic Drainage, Soft tissue Mobilization and Friction Massage were applied to the: L hand for 0 minutes, including:  -NT    Therapeutic exercises to develop strength, endurance, ROM and flexibility for 40 minutes, including:  -Updated objective measurements, see above.   Exercise Reps/Time   Tabletop PIP extension stretch 3 x 30 secs   Joint blocking to RF and LF DIP/PIP, differential joint blocking with pencil dorsal block x 10 each   Reverse joint blocking x 20 reps   TGEs, finger lifts, isolated EDC x 10 each    Finger add/abd with yellow sponges x 20 each      Patient Education and Home Exercises      Education provided:   - Cont prior HEP provided, added LMB dynamic spring extension splint wear 3 x a day 15-30 min as tolerated.  - Progress towards goals     Written Home Exercises Provided: Patient instructed to cont prior HEP with additions.   Exercises were reviewed and Viki was able to demonstrate them prior to the end of the session.  Viki demonstrated good  understanding of the HEP provided. See EMR under Patient Instructions for exercises provided during therapy  sessions.      ASSESSMENT     Pt would continue to benefit from skilled OT. She returns for her first follow-up visit this date and endorses good adherence to HEP. Good improvements in ANDREWS of L LF however some regression in L RF evidenced by updated measurements. Pt had good response to exercises this date with some improvements in PIP ext by end of session. Issued LMB spring extension splints today and pt tolerated well. Will continue to progress as tolerated. May fabricate nighttime orthoses if needed pending response to LMB splints.      Viki is progressing well towards her goals and there are no updates to goals at this time. Pt prognosis is Good.     Pt will continue to benefit from skilled outpatient occupational therapy to address the deficits listed in the problem list on initial evaluation, provide pt/family education and to maximize pt's level of independence in the home and community environment.     Pt's spiritual, cultural and educational needs considered and pt agreeable to plan of care and goals.    Anticipated barriers to occupational therapy: delay in treatment     Goals:  Short Term Goals (4 weeks)   1)  Patient to be IND with HEP and modalities for pain management. ------progressing not met 7/18/2022  2)  Increase ROM 3-10 degrees to increase functional hand use for ADLs/work/leisure activities. ------progressing not met 7/18/2022  3)   Decrease edema .1-.5mm to increase joint mobility /flexibility for functional hand use. ------progressing not met 7/18/2022  4)  Assess  and  3 PT pinch and set goals accordingly. ------progressing not met 7/18/2022      Long Term Goals (8 weeks)   1)  Increase  strength 2-8 lbs for driving, gym workouts. ------progressing not met 7/18/2022  2)  Increase 3 PT pinch strength 1-4 psi's for writing, typing and FM activities. ------progressing not met 7/18/2022  3)  Increase ROM 11-20 degrees to increase functional hand use for ADLs/work/leisure activities.  ------progressing not met 7/18/2022     PLAN     Continue skilled occupational therapy with individualized plan of care focusing on increasing ROM and maximizing functional use of patient's left hand.     Updates/Grading for next session: progress as tolerated.    Jennifer Law, OTR/L

## 2022-07-20 ENCOUNTER — CLINICAL SUPPORT (OUTPATIENT)
Dept: REHABILITATION | Facility: HOSPITAL | Age: 47
End: 2022-07-20
Payer: OTHER MISCELLANEOUS

## 2022-07-20 ENCOUNTER — PATIENT MESSAGE (OUTPATIENT)
Dept: INTERNAL MEDICINE | Facility: CLINIC | Age: 47
End: 2022-07-20
Payer: COMMERCIAL

## 2022-07-20 DIAGNOSIS — M20.002 FINGER DEFORMITY, ACQUIRED, LEFT: ICD-10-CM

## 2022-07-20 DIAGNOSIS — R29.898 DECREASED GRIP STRENGTH OF LEFT HAND: ICD-10-CM

## 2022-07-20 DIAGNOSIS — M25.60 RANGE OF MOTION DEFICIT: Primary | ICD-10-CM

## 2022-07-20 DIAGNOSIS — U07.1 COVID: Primary | ICD-10-CM

## 2022-07-20 PROCEDURE — 97760 ORTHOTIC MGMT&TRAING 1ST ENC: CPT

## 2022-07-20 PROCEDURE — 97018 PARAFFIN BATH THERAPY: CPT

## 2022-07-20 PROCEDURE — 97110 THERAPEUTIC EXERCISES: CPT

## 2022-07-20 NOTE — PROGRESS NOTES
[facility]  Occupational Therapy Treatment Note    Date: 7/20/2022  Name: Janae Soto  Clinic Number: [id number]    Therapy Diagnosis:   Encounter Diagnoses   Name Primary?    Range of motion deficit Yes    Decreased  strength of left hand     Finger deformity, acquired, left      Physician: Stephie Barrera PA Dr. L. 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 Difficult to make a fist after removing splints.   She was compliant with home exercise program given last session.   Response to previous treatment: Good - improving digit flexion and extension  Functional change: improved fist     Pain: 0/10  Location: left ring and long fingers       OBJECTIVE   Objective Measures updated at progress report unless specified.    Observation/Appearance:  Joint stiffness, Deformities noted: boutonierre deformities to Left MF/RF, Joint laxity and Hypermobility      Sensation:   Intact      Wound/Scar:   None      Edema. Measured in centimeters.               Middle / ring   PP 5.4 / 5.4   PIP 5.5 / 5.5   MP 5.0/ 4.5   DIP 4.4 / 3.9   DP 3.9 / 3.3       Hand ROM. Measured in degrees.                  MIDDLE / RING (6/30/22)  MP +25 / 94                 +25/95  PIP 20/ 77                   16/82   DIP +10/52                  +12/ 45      ANDREWS 238         /            243     MIDDLE / RING (7/18/2022)  MP +25 / 95                 +25/95  PIP -15/ 84                  -20/80  DIP +10/65                  0/55     ANDREWS 264 (+26)    /           235 (-8)      Manual Muscle Testing   FDP, FDS 3-/5   EDC 3-/5      Special Tests:  NT      Strength (Dyanmometer) and Pinch Strength (Pinch Gauge)  Measured in pounds and psi. Average of three trials.     · To be assessed at 6-8 weeks post injury       6/30/2022 6/30/2022     RIGHT LEFT    Rung II TBA TBA   Key Pinch NT NT   3pt Pinch TBA TBA   2pt Pinch NT         NT      Treatment     Viki received the treatments listed below:     Supervised modalities after being cleared for contradictions: Paraffin bath - with moist heat pack to L hand(s) in extension stretch with 3 lb weight on top for 10 minutes to increase blood flow, circulation, pain management, and for tissue elasticity prior to therex.     Manual therapy techniques: Joint mobilizations, Manual Lymphatic Drainage, Soft tissue Mobilization and Friction Massage were applied to the: L hand for 0 minutes, including:  -NT    Orthotic fit and training for 18 minutes:  -Fabricated and applied static finger gutter orthoses to L RF and LF for nighttime wear into passive extension. Pt able to don and doff independently.     Therapeutic exercises to develop strength, endurance, ROM and flexibility for 15 minutes, including:  Exercise Reps/Time   Tabletop PIP extension stretch 3 x 30 secs   Joint blocking to RF and LF DIP/PIP, differential joint blocking with pencil dorsal block x 10 each    Reverse joint blocking x 20 reps   TGEs, finger lifts x 10 each    Finger add/abd with red sponges x 20 each      Patient Education and Home Exercises      Education provided:   - Cont prior HEP provided, added static orthoses wear at nighttime.   - Progress towards goals     Written Home Exercises Provided: Patient instructed to cont prior HEP with additions.   Exercises were reviewed and  Viki was able to demonstrate them prior to the end of the session.  Vkii demonstrated good  understanding of the HEP provided. See EMR under Patient Instructions for exercises provided during therapy sessions.      ASSESSMENT     Pt would continue to benefit from skilled OT. She continues to endorse good adherence to HEP. Good improvements in ROM of both LF and RF noted by observation. Pt had good response to exercises this date with some improvements in PIP ext by end of session. Fabricated static finger orthoses today and pt tolerated well with no complaints at this time. Will continue to progress as tolerated.    Viki is progressing well towards her goals and there are no updates to goals at this time. Pt prognosis is Good.     Pt will continue to benefit from skilled outpatient occupational therapy to address the deficits listed in the problem list on initial evaluation, provide pt/family education and to maximize pt's level of independence in the home and community environment.     Pt's spiritual, cultural and educational needs considered and pt agreeable to plan of care and goals.    Anticipated barriers to occupational therapy: delay in treatment     Goals:  Short Term Goals (4 weeks)   1)  Patient to be IND with HEP and modalities for pain management. ------progressing not met 7/20/2022  2)  Increase ROM 3-10 degrees to increase functional hand use for ADLs/work/leisure activities. ------progressing not met 7/20/2022  3)   Decrease edema .1-.5mm to increase joint mobility /flexibility for functional hand use. ------progressing not met 7/20/2022  4)  Assess  and  3 PT pinch and set goals accordingly. ------progressing not met 7/20/2022      Long Term Goals (8 weeks)   1)  Increase  strength 2-8 lbs for driving, gym workouts. ------progressing not met 7/20/2022  2)  Increase 3 PT pinch strength 1-4 psi's for writing, typing and FM activities. ------progressing not met 7/20/2022  3)  Increase ROM  11-20 degrees to increase functional hand use for ADLs/work/leisure activities. ------progressing not met 7/20/2022     PLAN     Continue skilled occupational therapy with individualized plan of care focusing on increasing ROM and maximizing functional use of patient's left hand.     Updates/Grading for next session: progress as tolerated. Assess /pinch strength.    Jennifer Law, OTR/L

## 2022-07-27 ENCOUNTER — CLINICAL SUPPORT (OUTPATIENT)
Dept: REHABILITATION | Facility: HOSPITAL | Age: 47
End: 2022-07-27
Payer: OTHER MISCELLANEOUS

## 2022-07-27 DIAGNOSIS — M25.60 RANGE OF MOTION DEFICIT: Primary | ICD-10-CM

## 2022-07-27 DIAGNOSIS — M20.002 FINGER DEFORMITY, ACQUIRED, LEFT: ICD-10-CM

## 2022-07-27 DIAGNOSIS — R29.898 DECREASED GRIP STRENGTH OF LEFT HAND: ICD-10-CM

## 2022-07-27 PROCEDURE — 97018 PARAFFIN BATH THERAPY: CPT

## 2022-07-27 PROCEDURE — 97110 THERAPEUTIC EXERCISES: CPT

## 2022-07-27 NOTE — PROGRESS NOTES
[facility]  Occupational Therapy Treatment Note    Date: 7/27/2022  Name: Janae Soto  Clinic Number: [id number]    Therapy Diagnosis:   Encounter Diagnoses   Name Primary?    Range of motion deficit Yes    Decreased  strength of left hand     Finger deformity, acquired, left      Physician: Stephie Barrera PA; Dr. 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 She had Covid the past week and wasn't able to perform HEP as frequently.   She was compliant with home exercise program given last session.   Response to previous treatment: Good - improving digit flexion and extension  Functional change: improved fist     Pain: 0/10  Location: left ring and long fingers       OBJECTIVE   Objective Measures updated at progress report unless specified.    Observation/Appearance:  Joint stiffness, Deformities noted: boutonierre deformities to Left MF/RF, Joint laxity and Hypermobility      Sensation:   Intact      Wound/Scar:   None      Edema. Measured in centimeters.               Middle / ring   PP 5.4 / 5.4   PIP 5.5 / 5.5   MP 5.0/ 4.5   DIP 4.4 / 3.9   DP 3.9 / 3.3       Hand ROM. Measured in degrees.                  MIDDLE / RING (6/30/22)  MP +25 / 94                 +25/95  PIP 20/ 77                   16/82   DIP +10/52                  +12/ 45      ANDREWS 238         /           243      MIDDLE / RING (7/18/2022)  MP +25 / 95                 +25/95  PIP -15/ 84                  -20/80  DIP +10/65                  0/55     ANDREWS 264 (+26)    /           235 (-8)      Manual Muscle Testing   FDP, FDS 3-/5   EDC 3-/5      Special Tests:  NT      Strength (Dyanmometer) and Pinch Strength (Pinch Gauge)  Measured in pounds and psi.     7/27/2022 7/27/2022     RIGHT LEFT    Rung II 63.0 53.2   Key Pinch 13 12   3pt Pinch 12 13   2pt Pinch 6          6      Treatment     Viki received the treatments listed below:     Supervised modalities after being cleared for contradictions: Paraffin bath - with moist heat pack to L hand(s) in extension stretch with 3 lb weight on top for 10 minutes to increase blood flow, circulation, pain management, and for tissue elasticity prior to therex.     Manual therapy techniques: Joint mobilizations, Manual Lymphatic Drainage, Soft tissue Mobilization and Friction Massage were applied to the: L hand for 0 minutes, including:  -NT    Therapeutic exercises to develop strength, endurance, ROM and flexibility for 33 minutes, including:  Exercise Reps/Time   Tabletop PIP extension stretch 3 x 30 secs   Joint blocking to RF and LF DIP/PIP    Differential joint blocking with pencil dorsal block 2 x 10 each        x 10 each   Reverse joint blocking x 20 reps    TGEs, finger lifts x 10 each    Finger add/abd with red sponges x 20 each    Theraputty HEP: yellow 2 min gripping  2 logs: tripod pinch  Finger scrapes x 10 reps flexion     Patient Education and Home Exercises      Education provided:   - Cont prior HEP provided, added theraputty exercises for strengthening.   - Progress towards goals     Written Home Exercises Provided: yes.  Exercises were reviewed and Viki was able to demonstrate them prior to the end of the session.  Viki demonstrated good  understanding of the HEP provided. See EMR under Patient Instructions for exercises provided during therapy sessions.       ASSESSMENT     Pt would continue to benefit from skilled OT. Good ROM of both LF and RF noted by observation; still limited with hook fist and PIP ext due to tight intrinsics. Pt had good response to exercises this date with some improvements in PIP ext by end of session. Deficits in strength evidenced by updated measurements, initiated putty strengthening to address. Will continue to progress as tolerated.     Viki is progressing well towards her goals and there are no updates to goals at this time. Pt prognosis is Good.     Pt will continue to benefit from skilled outpatient occupational therapy to address the deficits listed in the problem list on initial evaluation, provide pt/family education and to maximize pt's level of independence in the home and community environment.     Pt's spiritual, cultural and educational needs considered and pt agreeable to plan of care and goals.    Anticipated barriers to occupational therapy: delay in treatment     Goals:  Short Term Goals (4 weeks)   1)  Patient to be IND with HEP and modalities for pain management. ------progressing not met 7/27/2022  2)  Increase ROM 3-10 degrees to increase functional hand use for ADLs/work/leisure activities. ------progressing not met 7/27/2022  3)   Decrease edema .1-.5mm to increase joint mobility /flexibility for functional hand use. ------progressing not met 7/27/2022  4)  Assess  and  3 PT pinch and set goals accordingly. ------progressing not met 7/27/2022      Long Term Goals (8 weeks)   1)  Increase  strength 2-8 lbs for driving, gym workouts. ------progressing not met 7/27/2022  2)  Increase 3 PT pinch strength 1-4 psi's for writing, typing and FM activities. ------progressing not met 7/27/2022  3)  Increase ROM 11-20 degrees to increase functional hand use for ADLs/work/leisure activities. ------progressing not met 7/27/2022     PLAN     Continue skilled occupational therapy with individualized plan of care focusing  on increasing ROM and maximizing functional use of patient's left hand.     Updates/Grading for next session: progress as tolerated.     Jennifer Law, OTR/L

## 2022-07-27 NOTE — PATIENT INSTRUCTIONS
[facility]  OCCUPATIONAL THERAPY  HOME EXERCISE PROGRAM     Complete the following strengthening program 1x/day.           2 minutes         2 logs  _   10 reps       20 reps    Jennifer Law, OTR/L

## 2022-08-01 ENCOUNTER — CLINICAL SUPPORT (OUTPATIENT)
Dept: REHABILITATION | Facility: HOSPITAL | Age: 47
End: 2022-08-01
Payer: OTHER MISCELLANEOUS

## 2022-08-01 DIAGNOSIS — R29.898 DECREASED GRIP STRENGTH OF LEFT HAND: ICD-10-CM

## 2022-08-01 DIAGNOSIS — M25.60 RANGE OF MOTION DEFICIT: Primary | ICD-10-CM

## 2022-08-01 DIAGNOSIS — M20.002 FINGER DEFORMITY, ACQUIRED, LEFT: ICD-10-CM

## 2022-08-01 PROCEDURE — 97018 PARAFFIN BATH THERAPY: CPT

## 2022-08-01 PROCEDURE — 97110 THERAPEUTIC EXERCISES: CPT

## 2022-08-01 NOTE — PROGRESS NOTES
[facility]  Occupational Therapy Treatment Note    Date: 8/1/2022  Name: Janae Soto  Clinic Number: [id number]    Therapy Diagnosis:   Encounter Diagnoses   Name Primary?    Range of motion deficit Yes    Decreased  strength of left hand     Finger deformity, acquired, left      Physician: Stephie Barrera PA; Dr. 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 She feels her fingers are straighter.   She was compliant with home exercise program given last session.   Response to previous treatment: Good - improving digit flexion and extension  Functional change: improved fist     Pain: 0/10  Location: left ring and long fingers       OBJECTIVE   Objective Measures updated at progress report unless specified.    Observation/Appearance:  Joint stiffness, Deformities noted: boutonierre deformities to Left MF/RF, Joint laxity and Hypermobility      Sensation:   Intact      Wound/Scar:   None      Edema. Measured in centimeters.               Middle / ring   PP 5.4 / 5.4   PIP 5.5 / 5.5   MP 5.0/ 4.5   DIP 4.4 / 3.9   DP 3.9 / 3.3       Hand ROM. Measured in degrees.                  MIDDLE / RING (6/30/22)  MP +25 / 94                 +25/95  PIP 20/ 77                   16/82   DIP +10/52                  +12/ 45      ANDREWS 238         /           243     MIDDLE / RING (7/18/2022)  MP  +25 / 95                 +25/95  PIP -15/ 84                  -20/80  DIP +10/65                  0/55     ANDREWS 264 (+26)    /           235 (-8)     MIDDLE / RING (8/1/2022)  MP +25 / 95                 +25/95  PIP -14/ 90                -12/84  DIP +10/65                  0/55      ANDREWS 271 (+7)    /           247(+12)        Manual Muscle Testing   FDP, FDS 3-/5   EDC 3-/5      Special Tests:  NT      Strength (Dyanmometer) and Pinch Strength (Pinch Gauge)  Measured in pounds and psi.     7/27/2022 7/27/2022     RIGHT LEFT    Rung II 63.0 53.2   Key Pinch 13 12   3pt Pinch 12 13   2pt Pinch 6          6      Treatment     Viki received the treatments listed below:     Supervised modalities after being cleared for contradictions: Paraffin bath - with moist heat pack to L hand(s) in extension stretch with 3 lb weight on top for 10 minutes to increase blood flow, circulation, pain management, and for tissue elasticity prior to therex.     Manual therapy techniques: Joint mobilizations, Manual Lymphatic Drainage, Soft tissue Mobilization and Friction Massage were applied to the: L hand for 0 minutes, including:  -NT    Therapeutic exercises to develop strength, endurance, ROM and flexibility for 25 minutes, including:  Exercise Reps/Time   Tabletop PIP extension stretch 3 x 30 secs   Joint blocking to RF and LF DIP/PIP    Differential joint blocking with pencil dorsal block 2 x 10 each        x 10 each   Reverse joint blocking x 20 reps    TGEs, finger lifts x 10 each    Finger add/abd with red sponges x 20 each    Theraputty HEP: yellow  At home:  2 min gripping  2 logs: tripod pinch  Finger scrapes x 10 reps flexion     Patient Education and Home Exercises      Education provided:   - Cont prior HEP provided  - Progress towards goals     Written Home Exercises Provided: Patient instructed to cont prior HEP.  Exercises were reviewed and Viki was able to demonstrate them prior to the end of the session.  Viki  demonstrated good  understanding of the HEP provided. See EMR under Patient Instructions for exercises provided during therapy sessions.      ASSESSMENT     Pt would continue to benefit from skilled OT. Good ROM of both LF and RF noted with improvements by 7 and 12 deg, respectively. Still limited with hook fist and PIP ext due to tight intrinsics. Pt had good response to exercises this date. Will continue to progress as tolerated.     Viki is progressing well towards her goals and there are no updates to goals at this time. Pt prognosis is Good.     Pt will continue to benefit from skilled outpatient occupational therapy to address the deficits listed in the problem list on initial evaluation, provide pt/family education and to maximize pt's level of independence in the home and community environment.     Pt's spiritual, cultural and educational needs considered and pt agreeable to plan of care and goals.    Anticipated barriers to occupational therapy: delay in treatment     Goals:  Short Term Goals (4 weeks)   1)  Patient to be IND with HEP and modalities for pain management. ------progressing not met 8/1/2022  2)  Increase ROM 3-10 degrees to increase functional hand use for ADLs/work/leisure activities. Met 8/1/2022  3)   Decrease edema .1-.5mm to increase joint mobility /flexibility for functional hand use. ------progressing not met 8/1/2022  4)  Assess  and  3 PT pinch and set goals accordingly. Met 7/27/2022     Long Term Goals (8 weeks)   1)  Increase  strength 2-8 lbs for driving, gym workouts. ------progressing not met 8/1/2022  2)  Increase 3 PT pinch strength 1-4 psi's for writing, typing and FM activities. ------progressing not met 8/1/2022  3)  Increase ROM 11-20 degrees to increase functional hand use for ADLs/work/leisure activities. ------progressing not met 8/1/2022     PLAN     Continue skilled occupational therapy with individualized plan of care focusing on increasing ROM and  maximizing functional use of patient's left hand.     Updates/Grading for next session: progress as tolerated. Possibly fabricate relative motion orthosis next session.     Jennifer Law, OTR/L

## 2022-08-03 ENCOUNTER — CLINICAL SUPPORT (OUTPATIENT)
Dept: REHABILITATION | Facility: HOSPITAL | Age: 47
End: 2022-08-03
Payer: OTHER MISCELLANEOUS

## 2022-08-03 DIAGNOSIS — M20.002 FINGER DEFORMITY, ACQUIRED, LEFT: ICD-10-CM

## 2022-08-03 DIAGNOSIS — M25.60 RANGE OF MOTION DEFICIT: Primary | ICD-10-CM

## 2022-08-03 DIAGNOSIS — R29.898 DECREASED GRIP STRENGTH OF LEFT HAND: ICD-10-CM

## 2022-08-03 PROCEDURE — 97018 PARAFFIN BATH THERAPY: CPT

## 2022-08-03 PROCEDURE — 97760 ORTHOTIC MGMT&TRAING 1ST ENC: CPT

## 2022-08-03 PROCEDURE — 97110 THERAPEUTIC EXERCISES: CPT

## 2022-08-03 NOTE — PROGRESS NOTES
[facility]  Occupational Therapy Treatment Note    Date: 8/3/2022  Name: Janae Soto  Clinic Number: [id number]    Therapy Diagnosis:   Encounter Diagnoses   Name Primary?    Range of motion deficit Yes    Decreased  strength of left hand     Finger deformity, acquired, left      Physician: Stephie Barrera PA; Dr. 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   She was compliant with home exercise program given last session.   Response to previous treatment: Good - improving digit flexion and extension  Functional change: improved fist     Pain: 0/10  Location: left ring and long fingers       OBJECTIVE   Objective Measures updated at progress report unless specified.    Observation/Appearance:  Joint stiffness, Deformities noted: boutonierre deformities to Left MF/RF, Joint laxity and Hypermobility      Sensation:   Intact      Wound/Scar:   None      Edema. Measured in centimeters.               Middle / ring   PP 5.4 / 5.4   PIP 5.5 / 5.5   MP 5.0/ 4.5   DIP 4.4 / 3.9   DP 3.9 / 3.3       Hand ROM. Measured in degrees.                  MIDDLE / RING (6/30/22)  MP +25 / 94                 +25/95  PIP 20/ 77                   16/82   DIP +10/52                  +12/ 45      ANDREWS 238         /           243     MIDDLE / RING  (7/18/2022)  MP +25 / 95                 +25/95  PIP -15/ 84                  -20/80  DIP +10/65                  0/55     ANDERWS 264 (+26)    /           235 (-8)     MIDDLE / RING (8/1/2022)  MP +25 / 95                 +25/95  PIP -14/ 90                -12/84  DIP +10/65                  0/55      ANDREWS 271 (+7)    /           247(+12)      MIDDLE / RING (8/3/2022)  MP +10/90                +10/85  PIP -10/86              -10/90  DIP +10/67                  0/55      ANDREWS 253 (-18)    /           230 (-17)     Manual Muscle Testing   FDP, FDS 3-/5   EDC 3-/5      Special Tests:  NT      Strength (Dyanmometer) and Pinch Strength (Pinch Gauge)  Measured in pounds and psi.     7/27/2022 7/27/2022     RIGHT LEFT    Rung II 63.0 53.2   Key Pinch 13 12   3pt Pinch 12 13   2pt Pinch 6          6      Treatment     Viki received the treatments listed below:     Supervised modalities after being cleared for contradictions: Paraffin bath - with moist heat pack to L hand(s) in extension stretch with 3 lb weight on top for 10 minutes to increase blood flow, circulation, pain management, and for tissue elasticity prior to therex.     Manual therapy techniques: Joint mobilizations, Manual Lymphatic Drainage, Soft tissue Mobilization and Friction Massage were applied to the: L hand for 0 minutes, including:  -NT    Orthotic fit and training for 40 minutes, including:  Fabricated and applied the following:  -DIP joint blocking exercise orthosis   -Hook stretch strap or coban wrapping   -Adjustments made to static nighttime finger gutter orthoses   -Relative motion orthosis for daytime wear     Therapeutic exercises to develop strength, endurance, ROM and flexibility for 10 minutes, including:  -Updated objective measurements, see above.   Exercise Reps/Time   Joint blocking to RF and LF DIP/PIP     2 x 10 each           Reverse joint blocking x 20 reps    Theraputty HEP: yellow  2 min gripping       Patient Education and Home  Exercises      Education provided:   - Cont prior HEP provided, added exercise orthosis, hook stretch strap, and relative motion orthosis wear.   - Progress towards goals     Written Home Exercises Provided: Patient instructed to cont prior HEP.  Exercises were reviewed and Viki was able to demonstrate them prior to the end of the session.  Viki demonstrated good  understanding of the HEP provided. See EMR under Patient Instructions for exercises provided during therapy sessions.      ASSESSMENT     Pt would continue to benefit from skilled OT. Good ROM of both LF and RF noted with improvements in PIP ext today. Still limited with hook fist and PIP ext due to tight intrinsics. Pt had good response to exercises this date. Treatment focused on fabricating new exercise orthosis, hook stretch strap, and relative motion orthosis today as pt is returning to work and is unable to dedicate time to exercises as frequently. Orthoses should help facilitate muscle balance by alternating throughout the day, along with LMB spring splint and static finger gutter orthoses wear. All appeared to fit well with no complaints from the pt at this time. Will continue to progress as tolerated.     Viki is progressing well towards her goals and there are no updates to goals at this time. Pt prognosis is Good.     Pt will continue to benefit from skilled outpatient occupational therapy to address the deficits listed in the problem list on initial evaluation, provide pt/family education and to maximize pt's level of independence in the home and community environment.     Pt's spiritual, cultural and educational needs considered and pt agreeable to plan of care and goals.    Anticipated barriers to occupational therapy: delay in treatment     Goals:  Short Term Goals (4 weeks)   1)  Patient to be IND with HEP and modalities for pain management. ------progressing not met 8/3/2022  2)  Increase ROM 3-10 degrees to increase functional hand use  for ADLs/work/leisure activities. Met 8/1/2022  3)   Decrease edema .1-.5mm to increase joint mobility /flexibility for functional hand use. ------progressing not met 8/3/2022  4)  Assess  and  3 PT pinch and set goals accordingly. Met 7/27/2022     Long Term Goals (8 weeks)   1)  Increase  strength 2-8 lbs for driving, gym workouts. ------progressing not met 8/3/2022  2)  Increase 3 PT pinch strength 1-4 psi's for writing, typing and FM activities. ------progressing not met 8/3/2022  3)  Increase ROM 11-20 degrees to increase functional hand use for ADLs/work/leisure activities. ------progressing not met 8/3/2022     PLAN     Continue skilled occupational therapy with individualized plan of care focusing on increasing ROM and maximizing functional use of patient's left hand.     Updates/Grading for next session: progress as tolerated.     Jennifer Law, OTR/L

## 2022-08-08 ENCOUNTER — CLINICAL SUPPORT (OUTPATIENT)
Dept: REHABILITATION | Facility: HOSPITAL | Age: 47
End: 2022-08-08
Payer: OTHER MISCELLANEOUS

## 2022-08-08 DIAGNOSIS — M25.60 RANGE OF MOTION DEFICIT: Primary | ICD-10-CM

## 2022-08-08 DIAGNOSIS — M20.002 FINGER DEFORMITY, ACQUIRED, LEFT: ICD-10-CM

## 2022-08-08 DIAGNOSIS — R29.898 DECREASED GRIP STRENGTH OF LEFT HAND: ICD-10-CM

## 2022-08-08 PROCEDURE — 97110 THERAPEUTIC EXERCISES: CPT

## 2022-08-08 PROCEDURE — 97018 PARAFFIN BATH THERAPY: CPT

## 2022-08-08 PROCEDURE — 97140 MANUAL THERAPY 1/> REGIONS: CPT

## 2022-08-08 NOTE — PROGRESS NOTES
[facility]  Occupational Therapy Treatment Note    Date: 8/8/2022  Name: Janae Soto  Clinic Number: [id number]    Therapy Diagnosis:   Encounter Diagnoses   Name Primary?    Range of motion deficit Yes    Decreased  strength of left hand     Finger deformity, acquired, left      Physician: Stephie Barrera PA; Dr. 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   She was compliant with home exercise program given last session.   Response to previous treatment: Good - improving digit flexion and extension  Functional change: improved fist     Pain: 0/10 at rest  Location: left ring and long fingers       OBJECTIVE   Objective Measures updated at progress report unless specified.    Observation/Appearance:  Joint stiffness, Deformities noted: boutonierre deformities to Left MF/RF, Joint laxity and Hypermobility      Sensation:   Intact      Wound/Scar:   None      Edema. Measured in centimeters.               Middle / ring   PP 5.4 / 5.4   PIP 5.5 / 5.5   MP 5.0/ 4.5   DIP 4.4 / 3.9   DP 3.9 / 3.3       Hand ROM. Measured in degrees.                  MIDDLE / RING (6/30/22)  MP +25 / 94                 +25/95  PIP 20/ 77                   16/82   DIP +10/52                  +12/ 45      ANDREWS 238         /           243     MIDDLE /  RING (7/18/2022)  MP +25 / 95                 +25/95  PIP -15/ 84                  -20/80  DIP +10/65                  0/55     ANDREWS 264 (+26)    /           235 (-8)     MIDDLE / RING (8/1/2022)  MP +25 / 95                 +25/95  PIP -14/ 90                -12/84  DIP +10/65                  0/55      ANDREWS 271 (+7)    /           247(+12)      MIDDLE / RING (8/3/2022)  MP +10/90                +10/85  PIP -10/86              -10/90  DIP +10/67                  0/55      ANDREWS 253 (-18)    /           230 (-17)     Manual Muscle Testing   FDP, FDS 3-/5   EDC 3-/5      Special Tests:  NT      Strength (Dyanmometer) and Pinch Strength (Pinch Gauge)  Measured in pounds and psi.     7/27/2022 7/27/2022     RIGHT LEFT    Rung II 63.0 53.2   Key Pinch 13 12   3pt Pinch 12 13   2pt Pinch 6          6      Treatment     Viki received the treatments listed below:     Supervised modalities after being cleared for contradictions: Paraffin bath - with moist heat pack to L hand(s) in extension stretch with 3 lb weight on top for 10 minutes to increase blood flow, circulation, pain management, and for tissue elasticity prior to therex.     Manual therapy techniques: Joint mobilizations, Manual Lymphatic Drainage, Soft tissue Mobilization and Friction Massage were applied to the: L hand for 8 minutes, including:  -cross friction massage using gua sha tool to increase blood flow to collateral ligaments of L RF and LF    Therapeutic exercises to develop strength, endurance, ROM and flexibility for 32 minutes, including:   Exercise Reps/Time   Tabletop PIP extension stretch, ORL stretch 3 x 30 secs   Joint blocking to RF and LF DIP/PIP     2 x 10 each           Reverse joint blocking x 20 reps    TGEs, finger lifts, hook to PIP ext x 10 each    Finger add/abd with red sponges x 20 each    Theraputty HEP:   Red in clinic  Yellow at home At home:  2 min gripping  2 logs: tripod pinch    In clinic:  Finger scrapes x 10 reps  flexion  Finger scrapes x 10 reps extension  PIP extension x 10 reps     Patient Education and Home Exercises      Education provided:   - Cont prior HEP provided, including exercise orthosis, hook stretch strap, and relative motion orthosis wear.   - Progress towards goals     Written Home Exercises Provided: Patient instructed to cont prior HEP.  Exercises were reviewed and Viki was able to demonstrate them prior to the end of the session.  Viki demonstrated good  understanding of the HEP provided. See EMR under Patient Instructions for exercises provided during therapy sessions.      ASSESSMENT     Pt would continue to benefit from skilled OT. Good ROM of both LF and RF noted with slight improvements in PIP ext today. Still limited with hook fist and PIP ext due to tightness/imbalance of intrinsics. Pt had good response to exercises this date. However, pt is returning to work and is unable to dedicate time to exercises as frequently. Orthoses fabricated last session cont to fit well with no complaints from the pt at this time. Will continue to progress as tolerated.     Viki is progressing well towards her goals and there are no updates to goals at this time. Pt prognosis is Good.     Pt will continue to benefit from skilled outpatient occupational therapy to address the deficits listed in the problem list on initial evaluation, provide pt/family education and to maximize pt's level of independence in the home and community environment.     Pt's spiritual, cultural and educational needs considered and pt agreeable to plan of care and goals.    Anticipated barriers to occupational therapy: delay in treatment, HEP time constraints due to work    Goals:  Short Term Goals (4 weeks)   1)  Patient to be IND with HEP and modalities for pain management. ------progressing not met 8/8/2022  2)  Increase ROM 3-10 degrees to increase functional hand use for ADLs/work/leisure activities. Met 8/1/2022  3)   Decrease edema  .1-.5mm to increase joint mobility /flexibility for functional hand use. ------progressing not met 8/8/2022  4)  Assess  and  3 PT pinch and set goals accordingly. Met 7/27/2022     Long Term Goals (8 weeks)   1)  Increase  strength 2-8 lbs for driving, gym workouts. ------progressing not met 8/8/2022  2)  Increase 3 PT pinch strength 1-4 psi's for writing, typing and FM activities. ------progressing not met 8/8/2022  3)  Increase ROM 11-20 degrees to increase functional hand use for ADLs/work/leisure activities. ------progressing not met 8/8/2022     PLAN     Continue skilled occupational therapy with individualized plan of care focusing on increasing ROM and maximizing functional use of patient's left hand.     Updates/Grading for next session: progress as tolerated.     Jennifer Law, OTR/L

## 2022-08-09 ENCOUNTER — PATIENT MESSAGE (OUTPATIENT)
Dept: REHABILITATION | Facility: HOSPITAL | Age: 47
End: 2022-08-09
Payer: COMMERCIAL

## 2022-08-16 ENCOUNTER — CLINICAL SUPPORT (OUTPATIENT)
Dept: REHABILITATION | Facility: HOSPITAL | Age: 47
End: 2022-08-16
Payer: OTHER MISCELLANEOUS

## 2022-08-16 DIAGNOSIS — M20.002 FINGER DEFORMITY, ACQUIRED, LEFT: ICD-10-CM

## 2022-08-16 DIAGNOSIS — R29.898 DECREASED GRIP STRENGTH OF LEFT HAND: ICD-10-CM

## 2022-08-16 DIAGNOSIS — M25.60 RANGE OF MOTION DEFICIT: Primary | ICD-10-CM

## 2022-08-16 PROCEDURE — 97110 THERAPEUTIC EXERCISES: CPT

## 2022-08-16 PROCEDURE — 97018 PARAFFIN BATH THERAPY: CPT

## 2022-08-16 NOTE — PROGRESS NOTES
"  [facility]  Occupational Therapy Treatment Note    Date: 8/16/2022  Name: Janae Soto  Clinic Number: [id number]    Therapy Diagnosis:   Encounter Diagnoses   Name Primary?    Range of motion deficit Yes    Decreased  strength of left hand     Finger deformity, acquired, left      Physician: Stephie Barrera PA; Dr. 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   Response to previous treatment: Good - improving digit flexion and extension  Functional change: improved fist, still awkward with writing and typing     Pain: 0/10 at rest  Location: left ring and long fingers       OBJECTIVE   Objective Measures updated at progress report unless specified.    Observation/Appearance:  Joint stiffness, Deformities noted: boutonierre deformities to Left MF/RF, Joint laxity and Hypermobility      Sensation:   Intact      Wound/Scar:   None      Edema. Measured in centimeters. (6/30/22)              Middle / ring   PP 5.4 / 5.4   PIP 5.5 / 5.5   MP 5.0/ 4.5   DIP 4.4 / 3.9   DP 3.9 / 3.3      8/16/2022        Middle / ring   PP 5.5 / 5.2  PIP 5.6 / 5.4   MP 5.3 / 5.1   DIP 4.8 / 4.4  DP 4.5 / 4.0     Hand ROM. Measured in degrees.                  MIDDLE / RING (6/30/22)  MP +25 / 94                 +25/95  PIP 20/ 77      "              16/82   DIP +10/52                  +12/ 45      ANDREWS 238         /           243     MIDDLE / RING (7/18/2022)  MP +25 / 95                 +25/95  PIP -15/ 84                  -20/80  DIP +10/65                  0/55     ANDREWS 264 (+26)    /           235 (-8)     MIDDLE / RING (8/1/2022)  MP +25 / 95                 +25/95  PIP -14/ 90                -12/84  DIP +10/65                  0/55      ANDREWS 271 (+7)    /           247(+12)      MIDDLE / RING (8/3/2022)  MP +10/90                +10/85  PIP -10/86              -10/90  DIP +10/67                  0/55      ANDREWS 253 (-18)    /           230 (-17)      MIDDLE / RING (8/16/2022)  MP +10/90               +10/88  PIP -6/88                  -7/88  DIP +10/70                0/60     ANDREWS 262 (+9, +24 since eval)    /           230 (+9, +30 since eval)     Manual Muscle Testing (6/30/22)  FDP, FDS 3-/5   EDC 3-/5     8/16/2022  FDP, FDS 5/5  EDC 5/5      Special Tests:  NT      Strength (Dyanmometer) and Pinch Strength (Pinch Gauge)  Measured in pounds and psi.     7/27/2022 7/27/2022 8/16/2022     RIGHT LEFT  Left   Rung II 63.0 53.2 58.9   [place] Pinch 13 12 12   3pt Pinch 12 13 13   2pt Pinch 6          6         7      Treatment     Viki received the treatments listed below:     Supervised modalities after being cleared for contradictions: Paraffin bath - with moist heat pack to L hand(s) in extension stretch with 3 lb weight on top for 10 minutes to increase blood flow, circulation, pain management, and for tissue elasticity prior to therex.         Therapeutic exercises to develop strength, endurance, ROM and flexibility for 53 minutes, including:   -Updated all objective measurements, see above.   -Issued finger sleeves size L to LF and RF for edema management.  Exercise Reps/Time   Tabletop PIP extension stretch, ORL stretch 3 x 30 secs   Joint blocking to RF and LF DIP/PIP    Differential joint blocking with pencil dorsal block 2 x 10  each        x 10 each   Reverse joint blocking x 20 reps    TGEs, finger lifts, hook to PIP ext x 10 each    Finger add/abd with blue sponges x 20 each    Theraputty HEP:   Red/Yellow At home:  2 logs: tripod pinch    In clinic:  2 min gripping   Finger scrapes x 10 reps flexion  Finger scrapes x 10 reps extension  PIP extension x 10 reps     Patient Education and Home Exercises      Education provided:   - Cont prior HEP provided, including exercise orthosis, hook stretch strap, and relative motion orthosis wear.   - Progress towards goals     Written Home Exercises Provided: Patient instructed to cont prior HEP.  Exercises were reviewed and Viki was able to demonstrate them prior to the end of the session.  Viki demonstrated good  understanding of the HEP provided. See EMR under Patient Instructions for exercises provided during therapy sessions.      ASSESSMENT     Pt would continue to benefit from skilled OT. Improved ROM of both LF and RF in both flexion and extension evidenced by updated measures. Very minimal boutonniere deformities in RF/LF and good composite fist noted. Good increase in  strength as well. Various orthoses fabricated cont to fit well with no complaints from the pt at this time. Full independence with HEP. Will continue to progress as tolerated.     Viki is progressing well towards her goals and there are no updates to goals at this time. Pt prognosis is Good.     Pt will continue to benefit from skilled outpatient occupational therapy to address the deficits listed in the problem list on initial evaluation, provide pt/family education and to maximize pt's level of independence in the home and community environment.     Pt's spiritual, cultural and educational needs considered and pt agreeable to plan of care and goals.    Anticipated barriers to occupational therapy: delay in treatment, HEP time constraints due to work    Goals:  Short Term Goals (4 weeks)   1)  Patient to be IND with  HEP and modalities for pain management. Met, ongoing 8/16/2022  2)  Increase ROM 3-10 degrees to increase functional hand use for ADLs/work/leisure activities. Met 8/1/2022  3)  Decrease edema .1-.5mm to increase joint mobility /flexibility for functional hand use. ------progressing not met 8/16/2022  4)  Assess  and  3 PT pinch and set goals accordingly. Met 7/27/2022     Long Term Goals (8 weeks)   1)  Increase  strength 2-8 lbs for driving, gym workouts. Met 8/16/2022   2)  Increase 3 PT pinch strength 1-4 psi's for writing, typing and FM activities. ------progressing not met 8/16/2022   3)  Increase ROM 11-20 degrees to increase functional hand use for ADLs/work/leisure activities. Met 8/16/2022     PLAN     Continue skilled occupational therapy with individualized plan of care focusing on increasing ROM and maximizing functional use of patient's left hand.     Updates/Grading for next session: progress as tolerated.     Jennifer Law, OTR/L

## 2022-08-18 ENCOUNTER — TELEPHONE (OUTPATIENT)
Dept: ORTHOPEDICS | Facility: CLINIC | Age: 47
End: 2022-08-18
Payer: COMMERCIAL

## 2022-08-22 ENCOUNTER — TELEPHONE (OUTPATIENT)
Dept: ORTHOPEDICS | Facility: CLINIC | Age: 47
End: 2022-08-22
Payer: COMMERCIAL

## 2022-08-22 ENCOUNTER — PATIENT MESSAGE (OUTPATIENT)
Dept: ORTHOPEDICS | Facility: CLINIC | Age: 47
End: 2022-08-22
Payer: COMMERCIAL

## 2022-08-22 NOTE — PROGRESS NOTES
Subjective:      Patient ID: Janae Soto is a 46 y.o. female.    Chief Complaint: Pain of the Left Hand      HPI  Janae Soto is a left hand dominant 46 y.o. female presenting today for follow up of left long and ring finger boutonierre deformities.  She was seen by Dr. Sevilla and plan to splint and do aggressive therapy.  She has been attending OT, has been regularly performing home exercises.  She does report improvement in her finger motion but is still unable to make a fist on the left.  She reports increased stiffness in the fingers in the morning, better with home exercises and use.  She is also using the LMB splints regularly. Denies finger numbness or tingling.   This was a work-comp injury - fell at work on 3/29/22, no surgical interventions recommended.      Review of patient's allergies indicates:  No Known Allergies      Current Outpatient Medications   Medication Sig Dispense Refill    albuterol (PROVENTIL) 2.5 mg /3 mL (0.083 %) nebulizer solution Take 3 mLs (2.5 mg total) by nebulization every 6 (six) hours as needed for Wheezing. Rescue 1 Box 1    albuterol (PROVENTIL/VENTOLIN HFA) 90 mcg/actuation inhaler Inhale 2 puffs into the lungs every 4 (four) hours as needed for Wheezing. 18 g 1    albuterol (VENTOLIN HFA) 90 mcg/actuation inhaler Inhale 2 puffs into the lungs every 6 (six) hours as needed for Wheezing. Rescue 18 g 0    fexofenadine-pseudoephedrine (ALLEGRA-D 24) 180-240 mg per 24 hr tablet Take 1 tablet by mouth once daily.      norgestimate-ethinyl estradioL (ORTHO TRI-CYCLEN LO, 28,) 0.18/0.215/0.25 mg-25 mcg tablet Take 1 tablet by mouth once daily. 90 tablet 0    ibuprofen (ADVIL,MOTRIN) 200 MG tablet Take 2 tablets (400 mg total) by mouth every 6 (six) hours as needed for Pain. (Patient not taking: Reported on 8/23/2022)  0     No current facility-administered medications for this visit.     Facility-Administered Medications Ordered in Other Visits   Medication Dose  "Route Frequency Provider Last Rate Last Admin    sodium chloride 0.9% bolus 1,000 mL  1,000 mL Intravenous Once Svitlana Alcala NP           Past Medical History:   Diagnosis Date    Asthma        Past Surgical History:   Procedure Laterality Date    SINUS SURGERY           Review of Systems:  ROS:  Constitutional: no fever or chills  Skin: no rash or suspicious lesions  Musculoskeletal: See HPI.   Neurological: no headaches, lightheadedness, or dizziness. No numbness or tingling  Psychological/behavioral: no anxiety or depression      OBJECTIVE:     PHYSICAL EXAM:  Height: 5' 7" (170.2 cm) Weight: 71.7 kg (158 lb)  Vitals:    08/23/22 0827   BP: 118/78   Pulse: 86   Weight: 71.7 kg (158 lb)   Height: 5' 7" (1.702 m)   PainSc: 0-No pain     Vitals reviewed.  Constitutional: NAD. Patient appears well-developed and well-nourished.   HENT:   Head: Normocephalic and atraumatic.   Neck: Normal range of motion.   Cardiovascular: Normal rate.    Pulmonary/Chest: Effort normal. No respiratory distress.   Neurological: Patient is awake, alert, oriented.   Psychiatric: Patient has a normal mood and affect. Behavior is normal. Judgment and thought content normal.  Musculoskeletal:  No scars noted.  No significant edema.  Persistent mild boutonniere deformities affecting the left long and ring fingers.  Mild decrease in extension at the PIP joints of the left long and ring fingers, mild decrease in PIP and DIP flexion the left long and ring fingers.  She is unable to make a full composite fist on the left.  Neurovascularly intact-good sensation and motor function, good capillary refill, 2+ radial pulses.    RADIOGRAPHS:  Left hand XRay, 6/28/2022  FINDINGS:  Again small avulsion fracture at the proximal interphalangeal joint of the 3rd finger is seen position and alignment is unchanged.  No other significant findings.     Impression:  See above    Comments: I have personally reviewed the imaging and I agree with the above " radiologist's report.    Left finger MRI, 6/23/2022  FINDINGS:  Bone marrow signal is maintained.  No acute fracture.  Previously seen long finger base of middle phalanx avulsion fracture is not evident, possibly healed.  No evidence for acute collateral ligament or volar plate injury.  Flexor and extensor tendons are unremarkable.  No evidence for annular pulley, extensor palacios, or sagittal band injury.  Cartilage spaces are maintained.  No effusion.     Impression:  Unremarkable examination.    ASSESSMENT/PLAN:   Janae was seen today for pain.    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 There may be typographical errors that have been missed during proof-reading.

## 2022-08-23 ENCOUNTER — OFFICE VISIT (OUTPATIENT)
Dept: ORTHOPEDICS | Facility: CLINIC | Age: 47
End: 2022-08-23
Payer: OTHER MISCELLANEOUS

## 2022-08-23 VITALS
BODY MASS INDEX: 24.8 KG/M2 | HEIGHT: 67 IN | SYSTOLIC BLOOD PRESSURE: 118 MMHG | DIASTOLIC BLOOD PRESSURE: 78 MMHG | HEART RATE: 86 BPM | WEIGHT: 158 LBS

## 2022-08-23 DIAGNOSIS — M25.642 DECREASED RANGE OF MOTION OF FINGER OF LEFT HAND: ICD-10-CM

## 2022-08-23 DIAGNOSIS — M20.022 BOUTONNIERE DEFORMITY OF FINGER OF LEFT HAND: Primary | ICD-10-CM

## 2022-08-23 PROCEDURE — 99213 OFFICE O/P EST LOW 20 MIN: CPT | Mod: S$GLB,,, | Performed by: PHYSICIAN ASSISTANT

## 2022-08-23 PROCEDURE — 99999 PR PBB SHADOW E&M-EST. PATIENT-LVL III: ICD-10-PCS | Mod: PBBFAC,,, | Performed by: PHYSICIAN ASSISTANT

## 2022-08-23 PROCEDURE — 99999 PR PBB SHADOW E&M-EST. PATIENT-LVL III: CPT | Mod: PBBFAC,,, | Performed by: PHYSICIAN ASSISTANT

## 2022-08-23 PROCEDURE — 99213 PR OFFICE/OUTPT VISIT, EST, LEVL III, 20-29 MIN: ICD-10-PCS | Mod: S$GLB,,, | Performed by: PHYSICIAN ASSISTANT

## 2022-08-24 ENCOUNTER — PATIENT MESSAGE (OUTPATIENT)
Dept: INTERNAL MEDICINE | Facility: CLINIC | Age: 47
End: 2022-08-24
Payer: COMMERCIAL

## 2022-08-30 ENCOUNTER — PATIENT MESSAGE (OUTPATIENT)
Dept: OBSTETRICS AND GYNECOLOGY | Facility: CLINIC | Age: 47
End: 2022-08-30
Payer: COMMERCIAL

## 2022-09-08 ENCOUNTER — PATIENT MESSAGE (OUTPATIENT)
Dept: REHABILITATION | Facility: HOSPITAL | Age: 47
End: 2022-09-08
Payer: COMMERCIAL

## 2022-09-12 ENCOUNTER — CLINICAL SUPPORT (OUTPATIENT)
Dept: REHABILITATION | Facility: HOSPITAL | Age: 47
End: 2022-09-12
Attending: PHYSICIAN ASSISTANT
Payer: OTHER MISCELLANEOUS

## 2022-09-12 DIAGNOSIS — M20.002 FINGER DEFORMITY, ACQUIRED, LEFT: ICD-10-CM

## 2022-09-12 DIAGNOSIS — R29.898 DECREASED GRIP STRENGTH OF LEFT HAND: ICD-10-CM

## 2022-09-12 DIAGNOSIS — M25.642 DECREASED RANGE OF MOTION OF FINGER OF LEFT HAND: ICD-10-CM

## 2022-09-12 DIAGNOSIS — M20.022 BOUTONNIERE DEFORMITY OF FINGER OF LEFT HAND: ICD-10-CM

## 2022-09-12 DIAGNOSIS — M25.60 RANGE OF MOTION DEFICIT: Primary | ICD-10-CM

## 2022-09-12 PROCEDURE — 97018 PARAFFIN BATH THERAPY: CPT

## 2022-09-12 PROCEDURE — 97110 THERAPEUTIC EXERCISES: CPT

## 2022-09-12 NOTE — PLAN OF CARE
" [facility]  Occupational Therapy Treatment Note/Plan of Care Note    Date: 9/12/2022  Name: Janae Soto  Clinic Number: [id number]    Therapy Diagnosis:   Encounter Diagnoses   Name Primary?    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 PHIL Nichols     Physician Orders: OT Eval and Treat  Medical Diagnosis: M79.645 (ICD-10-CM) - Finger pain, left; left Middle and ring finger boutonierre deformity, 3/29/22  Surgical Procedure and Date: N/A Date of Injury/Onset: 3/29/22  Evaluation Date: 6/30/2022  Insurance Authorization Period Expiration: 06/28/2023  Insurance: Worker's Comp  Plan of Care Expiration: 11/18/22 (9 weeks)  Progress Note Due: TBD  Date of Return to MD: 10/18/2022   Visit # / Visits authorized: 9 / 9  FOTO: N/A    Precautions:  Standard  Functional Level Prior to Evaluation: Independent with ADLs and IADLs    Time In: 4:05 pm  Time Out: 4:57 pm   Total Billable Time: 42 minutes    SUBJECTIVE      Pt reports: "I think it's about the same." Able to make a fist but not a tight fist. "I would just like for it not to hurt."   She was compliant with home exercise program given last session.   Response to previous treatment: Good - improving digit flexion and extension  Functional change: improved fist    Pain: 0/10 at rest, 3-4/10 at most   Location: left ring and long fingers       OBJECTIVE   Objective Measures updated at progress report unless specified.    Observation/Appearance:  Joint stiffness, Deformities noted: boutonierre deformities to Left MF/RF, Joint laxity and Hypermobility      Sensation:   Intact      Wound/Scar:   None      Edema. Measured in centimeters.   6/30/22 9/12/2022    Left Left   Long:       P1 5.4  5.3    PIP 5.5 5.4   P2 5.0 5.2    DIP 4.4 " 4.5   P3 3.9 4.0   Ring:       P1            5.4 5.2    PIP            5.5 5.2   P2             4.5 5.0    DIP 3.9 4.3   P3 3.3 4.0      Hand ROM. Measured in degrees.   6/30/22 8/16/2022 9/12/2022    Left Left Left         Long:  MP +25/94   +10/90       +10/90              PIP 20/ 77      -6/88    -9/90              DIP +10/52   +10/70     +10/75              ANDREWS 238    262 266         Ring:   MP    +25/95 +10/88 +10/90              PIP  16/82  -7/88 -9/90              DIP +12/ 45   +10/60 +10/75              ANDREWS 243 249 266       Strength (Dyanmometer) and Pinch Strength (Pinch Gauge)  Measured in pounds and psi.     7/27/2022 7/27/2022 8/16/2022 9/12/2022     RIGHT LEFT  Left Left   Rung II 63.0 53.2 58.9 63.2   Caba Pinch 13 12 12 13   3pt Pinch 12 13 13 14   2pt Pinch 6          6         7        9      Treatment     Viki received the treatments listed below:     Supervised modalities after being cleared for contradictions: Paraffin bath to L hand in fist for 10 minutes for soft tissue extensibility in preparation to exercise.       Therapeutic exercises to develop strength, endurance, ROM and flexibility for 42 minutes, including:   -Updated all objective measurements, see above.   Exercise Reps/Time   Tabletop PIP extension stretch, hook stretch 2 x 30 secs   Joint blocking to RF and LF DIP/PIP    Differential joint blocking with pencil dorsal block 2 x 10 each        x 10 each   Reverse joint blocking x 20 reps    Hook roll to fist, finger lifts, hook to PIP ext x 10 each    Finger add/abd with blue sponges x 20 each    Theraputty HEP:   Green 2 logs: tripod pinch   2 min gripping     At home:  Finger scrapes x 10 reps flexion  Finger scrapes x 10 reps extension  PIP extension x 10 reps     Patient Education and Home Exercises      Education provided:   - Cont prior HEP provided, including exercise orthosis, hook stretch strap, and relative motion orthosis wear.   - Upgraded to green theraputty for  hand strengthening HEP   - Progress towards goals     Written Home Exercises Provided: Patient instructed to cont prior HEP.  Exercises were reviewed and Viki was able to demonstrate them prior to the end of the session.  Viki demonstrated good  understanding of the HEP provided. See EMR under Patient Instructions for exercises provided during therapy sessions.      ASSESSMENT     Update:     Improved ROM of both LF and RF in flexion evidenced by updated measures. Very minimal boutonniere deformities in RF/LF and good composite fist noted, improved to tight fist following heat and stretch. Good increase in /pinch strengths as well. Will continue to progress as tolerated. Pt would continue to benefit from skilled OT services for another 9 visits to address new goals, with focus on improving ROM and increasing strength.    Viki is progressing well towards her goals and there are no updates to goals at this time. Pt prognosis is Good.     Pt will continue to benefit from skilled outpatient occupational therapy to address the deficits listed in the problem list on initial evaluation, provide pt/family education and to maximize pt's level of independence in the home and community environment.     Pt's spiritual, cultural and educational needs considered and pt agreeable to plan of care and goals.    Anticipated barriers to occupational therapy: delay in treatment, HEP time constraints due to work    Previous Goals Status:      Goals:  Short Term Goals (4 weeks)   1)  Patient to be IND with HEP and modalities for pain management. Met, ongoing 9/12/2022  2)  Increase ROM 3-10 degrees to increase functional hand use for ADLs/work/leisure activities. Met 8/1/2022  3)  Decrease edema .1-.5mm to increase joint mobility /flexibility for functional hand use. Met 9/12/2022  4)  Assess  and  3 PT pinch and set goals accordingly. Met 7/27/2022     Long Term Goals (8 weeks)   1)  Increase  strength 2-8 lbs for  driving, gym workouts. Met 8/16/2022   2)  Increase 3 PT pinch strength 1-4 psi's for writing, typing and FM activities. Met 9/12/2022   3)  Increase ROM 11-20 degrees to increase functional hand use for ADLs/work/leisure activities. Met 8/16/2022     New Short Term Goals Status = Long Term Goal Status: modified see below   Reasons for Recertification of Therapy: pt continues with functional limitations.     GOALS  1)  Patient to be IND with HEP and modalities for pain management. Met, ongoing 9/12/2022  2)  Increase ROM another 3-10 degrees to increase functional hand use for ADLs/work/leisure activities. ------progressing not met 9/12/2022  3)  Increase  strength another 3-5 lbs for driving, gym workouts. ------progressing not met 9/12/2022  4)  Increase 3 PT pinch strength another 1-3 psi's for writing, typing and FM activities. ------progressing not met 9/12/2022    PLAN     Continue skilled occupational therapy with individualized plan of care focusing on increasing ROM and maximizing functional use of patient's left hand.     Updates/Grading for next session: progress as tolerated.     Updated Certification Period: 9/12/2022 to 11/18/22 (9 weeks)   Recommended Treatment Plan: 9 visits Electrical Stimulation, Fluidotherapy, Iontophoresis, Manual Therapy, Moist Heat/ Ice, Neuromuscular Re-ed, Orthotic Management and Training, Paraffin, Patient Education, Self Care, Therapeutic Activities, Therapeutic Exercise, and Ultrasound  Other Recommendations: as needed.    Jennifer Law, OTR/L    I CERTIFY THE NEED FOR THESE SERVICES FURNISHED UNDER THIS PLAN OF TREATMENT AND WHILE UNDER MY CARE  Physician's comments:      Physician's Signature: ___________________________________________________

## 2022-09-12 NOTE — PROGRESS NOTES
[facility]: Occupational Therapy Note     See plan of care for updated POC.    Jennifer Law, BILL, OTR/L

## 2022-09-29 ENCOUNTER — CLINICAL SUPPORT (OUTPATIENT)
Dept: REHABILITATION | Facility: HOSPITAL | Age: 47
End: 2022-09-29
Attending: PHYSICIAN ASSISTANT
Payer: OTHER MISCELLANEOUS

## 2022-09-29 DIAGNOSIS — M25.60 RANGE OF MOTION DEFICIT: Primary | ICD-10-CM

## 2022-09-29 DIAGNOSIS — M20.002 FINGER DEFORMITY, ACQUIRED, LEFT: ICD-10-CM

## 2022-09-29 DIAGNOSIS — R29.898 DECREASED GRIP STRENGTH OF LEFT HAND: ICD-10-CM

## 2022-09-29 PROCEDURE — 97018 PARAFFIN BATH THERAPY: CPT

## 2022-09-29 PROCEDURE — 97140 MANUAL THERAPY 1/> REGIONS: CPT

## 2022-09-29 PROCEDURE — 97110 THERAPEUTIC EXERCISES: CPT

## 2022-09-29 NOTE — PROGRESS NOTES
[facility]  Occupational Therapy Treatment Note     Date: 9/29/2022  Name: Janae Soto  Clinic Number: [id number]     Therapy Diagnosis:        Encounter Diagnoses   Name Primary?    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 PHIL Nichols      Physician Orders: OT Eval and Treat  Medical Diagnosis: M79.645 (ICD-10-CM) - Finger pain, left; left Middle and ring finger boutonierre deformity, 3/29/22  Surgical Procedure and Date: N/A   Date of Injury/Onset: 3/29/22  Evaluation Date: 6/30/2022  Insurance Authorization Period Expiration: 08/23/2023  Insurance: Worker's Comp  Plan of Care Expiration: 11/18/22 (9 weeks)  Progress Note Due: TBD  Date of Return to MD: 10/18/2022   Visit # / Visits authorized: 10 / 18  FOTO: N/A     Precautions:  Standard  Functional Level Prior to Evaluation: Independent with ADLs and IADLs     Time In: 4:05 pm  Time Out: 4:50 pm   Total Billable Time: 45 minutes     SUBJECTIVE       Pt reports: They are doing pretty good, still look swollen  She was compliant with home exercise program given last session.   Response to previous treatment: Good - improving digit flexion and extension  Functional change: improved fist     Pain: 0/10 at rest, 3-4/10 at most   Location: left ring and long fingers        OBJECTIVE   Objective Measures updated at progress report unless specified.      Edema. Measured in centimeters.    6/30/22 9/12/2022     Left Left   Long:         P1 5.4  5.3    PIP 5.5 5.4   P2 5.0 5.2    DIP 4.4 4.5   P3 3.9 4.0   Ring:         P1            5.4 5.2    PIP            5.5 5.2   P2             4.5 5.0    DIP 3.9 4.3   P3 3.3 4.0      Hand ROM. Measured in degrees.    6/30/22 8/16/2022 9/12/2022     Left Left Left             Long:  MP  +   +10/90       +10/90              PIP       -    -              DIP +10/52   +10/70     +10/75              ANDREWS 238    262 266             Ring:   MP    + +10/88 +10/90              PIP    - -              DIP +12/ 45   +10 +10/75              ANDREWS 243 323 266       Strength (Dyanmometer) and Pinch Strength (Pinch Gauge)  Measured in pounds and psi.     2022     RIGHT LEFT  Left Left   Rung II 63.0 53.2 58.9 63.2   Caba Pinch 13 12 12 13   3pt Pinch 12 13 13 14   2pt Pinch 6          6         7        9      Treatment      Viki received the treatments listed below:      Supervised modalities after being cleared for contradictions: Paraffin bath to L hand in fist for 10 minutes for soft tissue extensibility in preparation to exercise.      Manual therapy techniques: Joint mobilizations, Manual Lymphatic Drainage, Soft tissue Mobilization and Friction Massage were applied to the: L hand for 10 minutes, including:  -cross friction massage and DTM to collateral ligaments using IASTYM tool and mini vibrator and RM to  edema  - applied edema sleeve large to both digits for night use.      Therapeutic exercises to develop strength, endurance, ROM and flexibility for 25 minutes, including:   -Updated all objective measurements, see above.   Exercise Reps/Time   Place and hold for digit extension, hook stretch 10 reps 3 sec hold   Joint blocking to RF and LF DIP/PIP     2 x 10 each           x 10 each   Reverse joint blocking x 20 reps    Hook roll to fist, hook to PIP ext x 10 each    Theraputty HEP:   Green 2 logs: tripod pinch   2 min gripping      At home:  Finger scrapes x 10 reps flexion  Finger scrapes x 10 reps extension  PIP extension x 10 reps    - added isolated FDP flexion with putty off end of table x 10 reps each   - isolated digit extension with putty x 10 reps each   -  Patient Education and Home Exercises       Education  provided:   - Cont prior HEP provided, including exercise orthosis, hook stretch strap, and relative motion orthosis wear.   - Upgraded to green theraputty for hand strengthening HEP   - Progress towards goals      Written Home Exercises Provided: Patient instructed to cont prior HEP.  Exercises were reviewed and Viki was able to demonstrate them prior to the end of the session.  Viki demonstrated good  understanding of the HEP provided. See EMR under Patient Instructions for exercises provided during therapy sessions.       ASSESSMENT      Update:      Improved ROM of both LF and RF in flexion evidenced by updated measures. Very minimal boutonniere deformities in RF/LF and good composite fist noted, improved to tight fist following heat and stretch. Good increase in /pinch strengths as well. Will continue to progress as tolerated.      Viki is progressing well towards her goals and there are no updates to goals at this time. Pt prognosis is Good.      Pt will continue to benefit from skilled outpatient occupational therapy to address the deficits listed in the problem list on initial evaluation, provide pt/family education and to maximize pt's level of independence in the home and community environment.      Pt's spiritual, cultural and educational needs considered and pt agreeable to plan of care and goals.     Anticipated barriers to occupational therapy: delay in treatment, HEP time constraints due to work     Previous Goals Status:       Goals:  Short Term Goals (4 weeks)   1)  Patient to be IND with HEP and modalities for pain management. Met, ongoing 9/12/2022  2)  Increase ROM 3-10 degrees to increase functional hand use for ADLs/work/leisure activities. Met 8/1/2022  3)  Decrease edema .1-.5mm to increase joint mobility /flexibility for functional hand use. Met 9/12/2022  4)  Assess  and  3 PT pinch and set goals accordingly. Met 7/27/2022     Long Term Goals (8 weeks)   1)  Increase   strength 2-8 lbs for driving, gym workouts. Met 8/16/2022   2)  Increase 3 PT pinch strength 1-4 psi's for writing, typing and FM activities. Met 9/12/2022   3)  Increase ROM 11-20 degrees to increase functional hand use for ADLs/work/leisure activities. Met 8/16/2022      New Short Term Goals Status = Long Term Goal Status: modified see below   Reasons for Recertification of Therapy: pt continues with functional limitations.      GOALS  1)  Patient to be IND with HEP and modalities for pain management. Met, ongoing 9/12/2022  2)  Increase ROM another 3-10 degrees to increase functional hand use for ADLs/work/leisure activities. ------progressing not met 9/12/2022  3)  Increase  strength another 3-5 lbs for driving, gym workouts. ------progressing not met 9/12/2022  4)  Increase 3 PT pinch strength another 1-3 psi's for writing, typing and FM activities. ------progressing not met 9/12/2022     PLAN      Continue skilled occupational therapy with individualized plan of care focusing on increasing ROM and maximizing functional use of patient's left hand.      Updates/Grading for next session: progress as tolerated.      Updated Certification Period: 9/12/2022 to 11/18/22 (9 weeks)   Recommended Treatment Plan: 9 visits Electrical Stimulation, Fluidotherapy, Iontophoresis, Manual Therapy, Moist Heat/ Ice, Neuromuscular Re-ed, Orthotic Management and Training, Paraffin, Patient Education, Self Care, Therapeutic Activities, Therapeutic Exercise, and Ultrasound  Other Recommendations: as needed.     KARMEN Bustos, CHT

## 2022-10-04 ENCOUNTER — OFFICE VISIT (OUTPATIENT)
Dept: OBSTETRICS AND GYNECOLOGY | Facility: CLINIC | Age: 47
End: 2022-10-04
Attending: OBSTETRICS & GYNECOLOGY
Payer: COMMERCIAL

## 2022-10-04 ENCOUNTER — TELEPHONE (OUTPATIENT)
Dept: OBSTETRICS AND GYNECOLOGY | Facility: CLINIC | Age: 47
End: 2022-10-04

## 2022-10-04 VITALS
SYSTOLIC BLOOD PRESSURE: 102 MMHG | HEIGHT: 67 IN | BODY MASS INDEX: 24.63 KG/M2 | WEIGHT: 156.94 LBS | DIASTOLIC BLOOD PRESSURE: 80 MMHG

## 2022-10-04 DIAGNOSIS — Z12.31 ENCOUNTER FOR SCREENING MAMMOGRAM FOR MALIGNANT NEOPLASM OF BREAST: ICD-10-CM

## 2022-10-04 DIAGNOSIS — N93.9 ABNORMAL UTERINE BLEEDING: ICD-10-CM

## 2022-10-04 DIAGNOSIS — Z01.419 WELL WOMAN EXAM: Primary | ICD-10-CM

## 2022-10-04 DIAGNOSIS — N95.1 MENOPAUSAL SYMPTOMS: ICD-10-CM

## 2022-10-04 PROCEDURE — 3079F DIAST BP 80-89 MM HG: CPT | Mod: CPTII,S$GLB,, | Performed by: OBSTETRICS & GYNECOLOGY

## 2022-10-04 PROCEDURE — 1159F MED LIST DOCD IN RCRD: CPT | Mod: CPTII,S$GLB,, | Performed by: OBSTETRICS & GYNECOLOGY

## 2022-10-04 PROCEDURE — 3008F PR BODY MASS INDEX (BMI) DOCUMENTED: ICD-10-PCS | Mod: CPTII,S$GLB,, | Performed by: OBSTETRICS & GYNECOLOGY

## 2022-10-04 PROCEDURE — 99396 PR PREVENTIVE VISIT,EST,40-64: ICD-10-PCS | Mod: S$GLB,,, | Performed by: OBSTETRICS & GYNECOLOGY

## 2022-10-04 PROCEDURE — 99396 PREV VISIT EST AGE 40-64: CPT | Mod: S$GLB,,, | Performed by: OBSTETRICS & GYNECOLOGY

## 2022-10-04 PROCEDURE — 1160F RVW MEDS BY RX/DR IN RCRD: CPT | Mod: CPTII,S$GLB,, | Performed by: OBSTETRICS & GYNECOLOGY

## 2022-10-04 PROCEDURE — 1160F PR REVIEW ALL MEDS BY PRESCRIBER/CLIN PHARMACIST DOCUMENTED: ICD-10-PCS | Mod: CPTII,S$GLB,, | Performed by: OBSTETRICS & GYNECOLOGY

## 2022-10-04 PROCEDURE — 3079F PR MOST RECENT DIASTOLIC BLOOD PRESSURE 80-89 MM HG: ICD-10-PCS | Mod: CPTII,S$GLB,, | Performed by: OBSTETRICS & GYNECOLOGY

## 2022-10-04 PROCEDURE — 99999 PR PBB SHADOW E&M-EST. PATIENT-LVL III: ICD-10-PCS | Mod: PBBFAC,,, | Performed by: OBSTETRICS & GYNECOLOGY

## 2022-10-04 PROCEDURE — 99999 PR PBB SHADOW E&M-EST. PATIENT-LVL III: CPT | Mod: PBBFAC,,, | Performed by: OBSTETRICS & GYNECOLOGY

## 2022-10-04 PROCEDURE — 1159F PR MEDICATION LIST DOCUMENTED IN MEDICAL RECORD: ICD-10-PCS | Mod: CPTII,S$GLB,, | Performed by: OBSTETRICS & GYNECOLOGY

## 2022-10-04 PROCEDURE — 3074F SYST BP LT 130 MM HG: CPT | Mod: CPTII,S$GLB,, | Performed by: OBSTETRICS & GYNECOLOGY

## 2022-10-04 PROCEDURE — 3074F PR MOST RECENT SYSTOLIC BLOOD PRESSURE < 130 MM HG: ICD-10-PCS | Mod: CPTII,S$GLB,, | Performed by: OBSTETRICS & GYNECOLOGY

## 2022-10-04 PROCEDURE — 3008F BODY MASS INDEX DOCD: CPT | Mod: CPTII,S$GLB,, | Performed by: OBSTETRICS & GYNECOLOGY

## 2022-10-04 RX ORDER — NORETHINDRONE ACETATE AND ETHINYL ESTRADIOL .02; 1 MG/1; MG/1
1 TABLET ORAL DAILY
Qty: 84 TABLET | Refills: 3 | Status: SHIPPED | OUTPATIENT
Start: 2022-10-04 | End: 2024-03-11

## 2022-10-04 NOTE — TELEPHONE ENCOUNTER
Fax received from [facility]   Pts Norethindrone ACET/GIFTY 1/20 TB 21's are not covered under insurance.  Staff asking pt to call insurance and find out what is covered under pts formulary.

## 2022-10-04 NOTE — PROGRESS NOTES
"CC: Well woman exam    Janae Soto is a 46 y.o. female  presents for well woman exam.  LMP: Patient's last menstrual period was 2022..  No gyn issues, problems, or complaints.       visit:  3 month hx heavy and longer cycles (up to 2 weeks).  Last month didn't have a cycle - just cramping and passed a blood clot.  Bled like a cycle for 1 day after that.  ? Hot flashes/night sweats.  No weight changes, constipation, hair loss.  U/S & EMB negative    On OCPs for about a year.  Cycles regular, normal flow.  Still with some night sweats     LGSIL/ HPV negative.   Colpo Biopsy BEBE 1   LEEP - ? path   pap normal   pap & HPV negative     MMG normal        Past Medical History:   Diagnosis Date    Asthma      Past Surgical History:   Procedure Laterality Date    SINUS SURGERY       Social History     Socioeconomic History    Marital status: Single   Tobacco Use    Smoking status: Never    Smokeless tobacco: Never   Substance and Sexual Activity    Alcohol use: Yes     Alcohol/week: 5.0 standard drinks     Types: 5 Glasses of wine per week     Comment: socially    Drug use: No    Sexual activity: Yes     Partners: Male     Birth control/protection: None     Family History   Problem Relation Age of Onset    Asthma Mother     No Known Problems Father     Diabetes Paternal Aunt     Asthma Maternal Grandfather     Cancer Neg Hx      OB History          1    Para   1    Term   1            AB        Living   1         SAB        IAB        Ectopic        Multiple        Live Births                     /80   Ht 5' 7" (1.702 m)   Wt 71.2 kg (156 lb 15.5 oz)   LMP 2022   BMI 24.58 kg/m²       ROS:  GENERAL: Denies weight gain or weight loss. Feeling well overall.   SKIN: Denies rash or lesions.   HEAD: Denies head injury or headache.   NODES: Denies enlarged lymph nodes.   CHEST: Denies chest pain or shortness of breath.   CARDIOVASCULAR: Denies palpitations " or left sided chest pain.   ABDOMEN: No abdominal pain, constipation, diarrhea, nausea, vomiting or rectal bleeding.   URINARY: No frequency, dysuria, hematuria, or burning on urination.  REPRODUCTIVE: See HPI.   BREASTS: The patient performs breast self-examination and denies pain, lumps, or nipple discharge.   HEMATOLOGIC: No easy bruisability or excessive bleeding.   MUSCULOSKELETAL: Denies joint pain or swelling.   NEUROLOGIC: Denies syncope or weakness.   PSYCHIATRIC: Denies depression, anxiety or mood swings.    PHYSICAL EXAM:  APPEARANCE: Well nourished, well developed, in no acute distress.  AFFECT: WNL, alert and oriented x 3  SKIN: No acne or hirsutism  NECK: Neck symmetric without masses or thyromegaly  NODES: No inguinal, cervical, axillary, or femoral lymph node enlargement  CHEST: Good respiratory effect  ABDOMEN: Soft.  No tenderness or masses.  No hepatosplenomegaly.  No hernias.  BREASTS: Symmetrical, no skin changes or visible lesions.  No palpable masses, nipple discharge bilaterally.  PELVIC: Normal external genitalia without lesions.  Normal hair distribution.  Adequate perineal body, normal urethral meatus.  Vagina moist and well rugated without lesions or discharge.  Cervix pink, without lesions, discharge or tenderness.  No significant cystocele or rectocele.  Bimanual exam shows uterus to be normal size, regular, mobile and nontender.  Adnexa without masses or tenderness.    EXTREMITIES: No edema.      ASSESSMENT & PLAN    ICD-10-CM ICD-9-CM    1. Well woman exam  Z01.419 V72.31       2. Encounter for screening mammogram for malignant neoplasm of breast  Z12.31 V76.12       3. Abnormal uterine bleeding  N93.9 626.9 norethindrone-ethinyl estradiol (MICROGESTIN 1/20) 1-20 mg-mcg per tablet      4. Menopausal symptoms  N95.1 627.2            Will switch to monophasic low dose ocp  Patient was counseled today on A.C.S. Pap guidelines and recommendations for yearly pelvic exams, mammograms and  monthly self breast exams; to see her PCP for other health maintenance.

## 2022-10-06 ENCOUNTER — PATIENT MESSAGE (OUTPATIENT)
Dept: OBSTETRICS AND GYNECOLOGY | Facility: CLINIC | Age: 47
End: 2022-10-06
Payer: COMMERCIAL

## 2022-10-06 ENCOUNTER — TELEPHONE (OUTPATIENT)
Dept: OBSTETRICS AND GYNECOLOGY | Facility: CLINIC | Age: 47
End: 2022-10-06
Payer: COMMERCIAL

## 2022-10-06 NOTE — TELEPHONE ENCOUNTER
[facility] Tri-Lo-Sprintec Tablets 28S  Staff sent message to pt to confirm this was the prescription she would like refilled.  
modified independent

## 2022-10-16 ENCOUNTER — PATIENT MESSAGE (OUTPATIENT)
Dept: OBSTETRICS AND GYNECOLOGY | Facility: CLINIC | Age: 47
End: 2022-10-16
Payer: COMMERCIAL

## 2022-10-20 ENCOUNTER — OFFICE VISIT (OUTPATIENT)
Dept: ORTHOPEDICS | Facility: CLINIC | Age: 47
End: 2022-10-20
Payer: OTHER MISCELLANEOUS

## 2022-10-20 ENCOUNTER — CLINICAL SUPPORT (OUTPATIENT)
Dept: REHABILITATION | Facility: HOSPITAL | Age: 47
End: 2022-10-20
Attending: PHYSICIAN ASSISTANT
Payer: OTHER MISCELLANEOUS

## 2022-10-20 VITALS
SYSTOLIC BLOOD PRESSURE: 116 MMHG | HEIGHT: 67 IN | WEIGHT: 156 LBS | HEART RATE: 76 BPM | BODY MASS INDEX: 24.48 KG/M2 | DIASTOLIC BLOOD PRESSURE: 80 MMHG

## 2022-10-20 DIAGNOSIS — M25.60 RANGE OF MOTION DEFICIT: Primary | ICD-10-CM

## 2022-10-20 DIAGNOSIS — M25.642 DECREASED RANGE OF MOTION OF FINGER OF LEFT HAND: ICD-10-CM

## 2022-10-20 DIAGNOSIS — M20.002 FINGER DEFORMITY, ACQUIRED, LEFT: ICD-10-CM

## 2022-10-20 DIAGNOSIS — R29.898 DECREASED GRIP STRENGTH OF LEFT HAND: ICD-10-CM

## 2022-10-20 DIAGNOSIS — M20.022 BOUTONNIERE DEFORMITY OF FINGER OF LEFT HAND: Primary | ICD-10-CM

## 2022-10-20 PROCEDURE — 97140 MANUAL THERAPY 1/> REGIONS: CPT

## 2022-10-20 PROCEDURE — 99213 OFFICE O/P EST LOW 20 MIN: CPT | Mod: S$GLB,,, | Performed by: PHYSICIAN ASSISTANT

## 2022-10-20 PROCEDURE — 97018 PARAFFIN BATH THERAPY: CPT

## 2022-10-20 PROCEDURE — 99999 PR PBB SHADOW E&M-EST. PATIENT-LVL III: ICD-10-PCS | Mod: PBBFAC,,, | Performed by: PHYSICIAN ASSISTANT

## 2022-10-20 PROCEDURE — 99213 PR OFFICE/OUTPT VISIT, EST, LEVL III, 20-29 MIN: ICD-10-PCS | Mod: S$GLB,,, | Performed by: PHYSICIAN ASSISTANT

## 2022-10-20 PROCEDURE — 99999 PR PBB SHADOW E&M-EST. PATIENT-LVL III: CPT | Mod: PBBFAC,,, | Performed by: PHYSICIAN ASSISTANT

## 2022-10-20 NOTE — PROGRESS NOTES
" [facility]  Occupational Therapy Treatment Note     Date: 9/29/2022  Name: Janae Soto  Clinic Number: [id number]     Therapy Diagnosis:        Encounter Diagnoses   Name Primary?    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 PHIL Nichols      Physician Orders: OT Eval and Treat  Medical Diagnosis: M79.645 (ICD-10-CM) - Finger pain, left; left Middle and ring finger boutonierre deformity, 3/29/22  Surgical Procedure and Date: N/A   Date of Injury/Onset: 3/29/22  Evaluation Date: 6/30/2022  Insurance Authorization Period Expiration: 08/23/2023  Insurance: Worker's Comp  Plan of Care Expiration: 11/18/22 (9 weeks)  Progress Note Due: TBD  Date of Return to MD: 10/18/2022   Visit # / Visits authorized: 3/ 9  FOTO: N/A     Precautions:  Standard  Functional Level Prior to Evaluation: Independent with ADLs and IADLs     Time In: 3:40 pm  Time Out: 4:15 pm   Total Billable Time: 35 minutes     SUBJECTIVE       Pt reports: "My hands are fine"  She was compliant with home exercise program given last session.   Response to previous treatment: Good - improving digit flexion and extension  Functional change: improved fist     Pain: 0/10 at rest,   Location: left ring and long fingers        OBJECTIVE   Objective Measures updated at progress report unless specified.      Edema. Measured in centimeters.    6/30/22 9/12/2022 10/20/22     Left Left Left   Long:          P1 5.4  5.3 5.3    PIP 5.5 5.4 5.4   P2 5.0 5.2 5.0    DIP 4.4 4.5 4.5   P3 3.9 4.0 4.0   Ring:          P1            5.4 5.2 5.2    PIP            5.5 5.2 5.3   P2             4.5 5.0 4.9    DIP 3.9 4.3 4.1   P3 3.3 4.0 3.9      Hand ROM. Measured in degrees.    6/30/22 8/16/2022 9/12/2022 10/20/22     Left Left Left     "           Long:  MP +25/94   +10/90       +10/90 -10/90              PIP 20/ 77      -6/88    -9/90 -8/91              DIP +10/52   +10/70     +10/75 +20/72              ANDREWS 238    262 266 255              Ring:   MP    +25/95 +10/88 +10/90 -11/90              PIP  16/82  -7/88 -9/90 -7/91              DIP +12/ 45   +10/60 +10/75 +10/70              ANDREWS 243 249 266 243       Strength (Dyanmometer) and Pinch Strength (Pinch Gauge)  Measured in pounds and psi.     7/27/2022 7/27/2022 8/16/2022 9/12/2022 10/20/22     RIGHT LEFT  Left Left Left   Rung II 63.0 53.2 58.9 63.2 60.2   Caba Pinch 13 12 12 13 12   3pt Pinch 12 13 13 14 13   2pt Pinch 6          6         7        9 6.5      Treatment      Viki received the treatments listed below:      Supervised modalities after being cleared for contradictions: Paraffin bath to L hand in fist for 10 minutes for soft tissue extensibility in preparation to exercise.      Manual therapy techniques: Joint mobilizations, Manual Lymphatic Drainage, Soft tissue Mobilization and Friction Massage were applied to the: L hand for 25 minutes, including:    -Updated all objective measurements, see above.    -cross friction massage and DTM to collateral ligaments using IASTYM tool at- applied white edema sleeve to middle and right digits to decrease swelling   - Digit extension stretching, hook stretching, and composite flexion stretching  - Issued jesenia strap to wear on ring finger, long finger, and index finger to improve DIP and PIP flexion; pt instructed to wear during day during activity     NOT today:  Therapeutic exercises to develop strength, endurance, ROM and flexibility for 0 minutes, including:      Exercise Reps/Time   Place and hold for digit extension, hook stretch 10 reps 3 sec hold   Joint blocking to RF and LF DIP/PIP     2 x 10 each           x 10 each   Reverse joint blocking x 20 reps    Hook roll to fist, hook to PIP ext x 10 each    Theraputty HEP:   Green 2  logs: tripod pinch   2 min gripping      At home:  Finger scrapes x 10 reps flexion  Finger scrapes x 10 reps extension  PIP extension x 10 reps    - added isolated FDP flexion with putty off end of table x 10 reps each   - isolated digit extension with putty x 10 reps each     Patient Education and Home Exercises       Education provided:   - Cont prior HEP provided, including exercise orthosis, hook stretch strap, and relative motion orthosis wear.   - Upgraded to green theraputty for hand strengthening HEP   - Progress towards goals      Written Home Exercises Provided: Patient instructed to cont prior HEP.  Exercises were reviewed and Viki was able to demonstrate them prior to the end of the session.  Viki demonstrated good  understanding of the HEP provided. See EMR under Patient Instructions for exercises provided during therapy sessions.       ASSESSMENT        Pt demonstrated good active range of motion, very minimal PIP extension lag and increased DIP hyperextension on the long finger.  Very minimal boutonniere deformities in RF/long finger noted and good composite fist demo following heat and stretch. Pt reports feeling like she no longer needs therapy, is pleased with her current progress, and only reports stiffness in the morning and before heat use. Pt given jesenia straps to wear as described above and is in agreement with follow up in two weeks to assess progress. Will continue to progress as tolerated.      Viki is progressing well towards her goals and there are no updates to goals at this time. Pt prognosis is Good.      Pt will continue to benefit from skilled outpatient occupational therapy to address the deficits listed in the problem list on initial evaluation, provide pt/family education and to maximize pt's level of independence in the home and community environment.      Pt's spiritual, cultural and educational needs considered and pt agreeable to plan of care and goals.     Anticipated  barriers to occupational therapy: delay in treatment, HEP time constraints due to work     Previous Goals Status:       Goals:  Short Term Goals (4 weeks)   1)  Patient to be IND with HEP and modalities for pain management. Met, ongoing 9/12/2022  2)  Increase ROM 3-10 degrees to increase functional hand use for ADLs/work/leisure activities. Met 8/1/2022  3)  Decrease edema .1-.5mm to increase joint mobility /flexibility for functional hand use. Met 9/12/2022  4)  Assess  and  3 PT pinch and set goals accordingly. Met 7/27/2022     Long Term Goals (8 weeks)   1)  Increase  strength 2-8 lbs for driving, gym workouts. Met 8/16/2022   2)  Increase 3 PT pinch strength 1-4 psi's for writing, typing and FM activities. Met 9/12/2022   3)  Increase ROM 11-20 degrees to increase functional hand use for ADLs/work/leisure activities. Met 8/16/2022      New Short Term Goals Status = Long Term Goal Status: modified see below   Reasons for Recertification of Therapy: pt continues with functional limitations.      GOALS  1)  Patient to be IND with HEP and modalities for pain management. Met, ongoing 9/12/2022  2)  Increase ROM another 3-10 degrees to increase functional hand use for ADLs/work/leisure activities. ------progressing not met 9/12/2022  3)  Increase  strength another 3-5 lbs for driving, gym workouts. ------progressing not met 9/12/2022  4)  Increase 3 PT pinch strength another 1-3 psi's for writing, typing and FM activities. ------progressing not met 9/12/2022     PLAN      Continue skilled occupational therapy with individualized plan of care focusing on increasing ROM and maximizing functional use of patient's left hand.      Updates/Grading for next session: progress as tolerated.      Updated Certification Period: 9/12/2022 to 11/18/22 (9 weeks)   Recommended Treatment Plan: 9 visits Electrical Stimulation, Fluidotherapy, Iontophoresis, Manual Therapy, Moist Heat/ Ice, Neuromuscular Re-ed, Orthotic  Management and Training, Paraffin, Patient Education, Self Care, Therapeutic Activities, Therapeutic Exercise, and Ultrasound  Other Recommendations: as needed.     Santhosh Mcneal, OT  10/24/2022

## 2022-10-20 NOTE — PROGRESS NOTES
Subjective:      Patient ID: Janae Soto is a 46 y.o. female.    Chief Complaint: Pain of the Left Hand      HPI  Janae Soto is a left hand dominant 46 y.o. female presenting today for follow up of left long and ring finger boutonierre deformities.  She was seen by Dr. Sevilla and plan to splint and do aggressive therapy.  She has been attending OT, has been regularly performing home exercises.  She does report improvement in her finger motion and swelling.  she is using the LMB splint as needed. Denies finger numbness or tingling.   This was a work-comp injury - fell at work on 3/29/22, no surgical interventions recommended.      Review of patient's allergies indicates:  No Known Allergies      Current Outpatient Medications   Medication Sig Dispense Refill    albuterol (PROVENTIL) 2.5 mg /3 mL (0.083 %) nebulizer solution Take 3 mLs (2.5 mg total) by nebulization every 6 (six) hours as needed for Wheezing. Rescue 1 Box 1    albuterol (PROVENTIL/VENTOLIN HFA) 90 mcg/actuation inhaler Inhale 2 puffs into the lungs every 4 (four) hours as needed for Wheezing. 18 g 1    fexofenadine-pseudoephedrine (ALLEGRA-D 24) 180-240 mg per 24 hr tablet Take 1 tablet by mouth once daily.      norethindrone-ethinyl estradiol (MICROGESTIN 1/20) 1-20 mg-mcg per tablet Take 1 tablet by mouth once daily. 84 tablet 3    norgestimate-ethinyl estradioL (ORTHO TRI-CYCLEN LO, 28,) 0.18/0.215/0.25 mg-25 mcg tablet Take 1 tablet by mouth once daily. 90 tablet 0     No current facility-administered medications for this visit.     Facility-Administered Medications Ordered in Other Visits   Medication Dose Route Frequency Provider Last Rate Last Admin    sodium chloride 0.9% bolus 1,000 mL  1,000 mL Intravenous Once Svitlana Alcala NP           Past Medical History:   Diagnosis Date    Asthma        Past Surgical History:   Procedure Laterality Date    SINUS SURGERY           Review of Systems:  ROS:  Constitutional: no fever or  "chills  Skin: no rash or suspicious lesions  Musculoskeletal: See HPI.   Neurological: no headaches, lightheadedness, or dizziness. No numbness or tingling  Psychological/behavioral: no anxiety or depression      OBJECTIVE:     PHYSICAL EXAM:  Height: 5' 7" (170.2 cm) Weight: 70.8 kg (156 lb)  Vitals:    10/20/22 1455   BP: 116/80   Pulse: 76   Weight: 70.8 kg (156 lb)   Height: 5' 7" (1.702 m)   PainSc: 0-No pain     Vitals reviewed.  Constitutional: NAD. Patient appears well-developed and well-nourished.   HENT:   Head: Normocephalic and atraumatic.   Neck: Normal range of motion.   Cardiovascular: Normal rate.    Pulmonary/Chest: Effort normal. No respiratory distress.   Neurological: Patient is awake, alert, oriented.   Psychiatric: Patient has a normal mood and affect. Behavior is normal. Judgment and thought content normal.  Musculoskeletal:  No scars noted.  No significant edema.  Very mild, 5-10 degree decrease in extension at the PIP joints of the left long and ring fingers, very mild decrease in PIP and DIP flexion the left long and ring fingers.  She is able to make a full composite fist on the left, left fist is not as tight as the right.  Neurovascularly intact-good sensation and motor function, good capillary refill, 2+ radial pulses.    RADIOGRAPHS:  Left hand XRay, 6/28/2022  FINDINGS:  Again small avulsion fracture at the proximal interphalangeal joint of the 3rd finger is seen position and alignment is unchanged.  No other significant findings.     Impression:  See above    Comments: I have personally reviewed the imaging and I agree with the above radiologist's report.    Left finger MRI, 6/23/2022  FINDINGS:  Bone marrow signal is maintained.  No acute fracture.  Previously seen long finger base of middle phalanx avulsion fracture is not evident, possibly healed.  No evidence for acute collateral ligament or volar plate injury.  Flexor and extensor tendons are unremarkable.  No evidence for " annular pulley, extensor palacios, or sagittal band injury.  Cartilage spaces are maintained.  No effusion.     Impression:  Unremarkable examination.    ASSESSMENT/PLAN:   Janae was seen today for pain.    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 There may be typographical errors that have been missed during proof-reading.

## 2022-11-03 ENCOUNTER — CLINICAL SUPPORT (OUTPATIENT)
Dept: REHABILITATION | Facility: HOSPITAL | Age: 47
End: 2022-11-03
Payer: OTHER MISCELLANEOUS

## 2022-11-03 DIAGNOSIS — R29.898 DECREASED GRIP STRENGTH OF LEFT HAND: ICD-10-CM

## 2022-11-03 DIAGNOSIS — M20.002 FINGER DEFORMITY, ACQUIRED, LEFT: ICD-10-CM

## 2022-11-03 DIAGNOSIS — M25.60 RANGE OF MOTION DEFICIT: Primary | ICD-10-CM

## 2022-11-03 PROCEDURE — 97018 PARAFFIN BATH THERAPY: CPT

## 2022-11-03 PROCEDURE — 97110 THERAPEUTIC EXERCISES: CPT

## 2022-11-03 PROCEDURE — 97140 MANUAL THERAPY 1/> REGIONS: CPT

## 2022-11-03 NOTE — PROGRESS NOTES
" [facility]  Occupational Therapy Treatment Note and Discharge Summary     Date: 11/3/22  Name: Janae Soto  Clinic Number: [id number]     Therapy Diagnosis:        Encounter Diagnoses   Name Primary?    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 PHIL Nichols      Physician Orders: OT Eval and Treat  Medical Diagnosis: M79.645 (ICD-10-CM) - Finger pain, left; left Middle and ring finger boutonierre deformity, 3/29/22  Surgical Procedure and Date: N/A   Date of Injury/Onset: 3/29/22  Evaluation Date: 6/30/2022  Insurance Authorization Period Expiration: 08/23/2023  Insurance: Worker's Comp  Plan of Care Expiration: 11/18/22 (9 weeks)  Progress Note Due: TBD  Date of Return to MD: 10/18/2022   Visit # / Visits authorized: 3/ 9  FOTO: N/A     Precautions:  Standard  Functional Level Prior to Evaluation: Independent with ADLs and IADLs     Time In: 430 pm   Time Out: 510 pm   Total Billable Time: 40 minutes     SUBJECTIVE       Pt reports: "My hands are fine"  She was compliant with home exercise program given last session.   Response to previous treatment: Good - improving digit flexion and extension  Functional change: improved fist     Pain: 0/10 at rest,   Location: left ring and long fingers        OBJECTIVE   Objective Measures updated at progress report unless specified.      Discharge status as follows:   Edema. Measured in centimeters.    6/30/22 9/12/2022 10/20/22     Left Left Left   Long:          P1 5.4  5.3 5.3    PIP 5.5 5.4 5.4   P2 5.0 5.2 5.0    DIP 4.4 4.5 4.5   P3 3.9 4.0 4.0   Ring:          P1            5.4 5.2 5.2    PIP            5.5 5.2 5.3   P2             4.5 5.0 4.9    DIP 3.9 4.3 4.1   P3 3.3 4.0 3.9      Hand ROM. Measured in degrees.    6/30/22 " 8/16/2022 9/12/2022 10/20/22 11/3/22     Left Left Left Left  Left                Long:  MP +25/94   +10/90       +10/90 -10/90 0/94              PIP 20/ 77      -6/88    -9/90 -8/91 5/96              DIP +10/52   +10/70     +10/75 +20/72 0/74              ANDREWS 238    262 266 255 259               Ring:   MP    +25/95 +10/88 +10/90 -11/90 0/94              PIP  16/82  -7/88 -9/90 -7/91 5/96              DIP +12/ 45   +10/60 +10/75 +10/70 0/68              ANDREWS 243 249 266 243 253       Strength (Dyanmometer) and Pinch Strength (Pinch Gauge)  Measured in pounds and psi.     7/27/2022 7/27/2022 8/16/2022 9/12/2022 10/20/22     RIGHT LEFT  Left Left Left   Rung II 63.0 53.2 58.9 63.2 60.2   Caba Pinch 13 12 12 13 12   3pt Pinch 12 13 13 14 13   2pt Pinch 6          6         7        9       6.5      Treatment      Viki received the treatments listed below:      Supervised modalities after being cleared for contradictions: Paraffin bath to L hand in fist for 10 minutes for soft tissue extensibility in preparation to exercise.      Manual therapy techniques: Joint mobilizations, Manual Lymphatic Drainage, Soft tissue Mobilization and Friction Massage were applied to the: L hand for 10 minutes, including:    -Updated all objective measurements, see above.  -cross friction massage and DTM to collateral ligaments using IASTYM tool at- applied white edema sleeve to middle and right digits to decrease swelling   - Digit extension stretching, hook stretching, and composite flexion stretching  -     Therapeutic exercises to develop strength, endurance, ROM and flexibility for 20 minutes, including:      Exercise Reps/Time   Place and hold for digit extension, hook stretch 10 reps 3 sec hold   Joint blocking to RF and LF DIP/PIP     2 x 10 each           x 10 each   Reverse joint blocking x 20 reps    Hook roll to fist, hook to PIP ext x 10 each    Theraputty HEP:   Green 2 logs: tripod pinch   2 min gripping      At  home:  Finger scrapes x 10 reps flexion  Finger scrapes x 10 reps extension  PIP extension x 10 reps    - added isolated FDP flexion with putty off end of table x 10 reps each   - isolated digit extension with putty x 10 reps each     Patient Education and Home Exercises       Education provided:   - Cont prior HEP provided, including exercise orthosis, hook stretch strap, and relative motion orthosis wear.   - Upgraded to green theraputty for hand strengthening HEP   - Progress towards goals      Written Home Exercises Provided: Patient instructed to cont prior HEP.  Exercises were reviewed and Viki was able to demonstrate them prior to the end of the session.  Viki demonstrated good  understanding of the HEP provided. See EMR under Patient Instructions for exercises provided during therapy sessions.       ASSESSMENT      Viki is progressing well towards her goals and there are no updates to goals at this time. Pt prognosis is Good. ROM WFLs;  and pinch wfls.  Will recommend discharge at this time with patient in agreement.          Pt's spiritual, cultural and educational needs considered and pt agreeable to plan of care and goals.     Anticipated barriers to occupational therapy: delay in treatment, HEP time constraints due to work     Previous Goals Status:       Goals:  Short Term Goals (4 weeks)   1)  Patient to be IND with HEP and modalities for pain management. Met, ongoing 9/12/2022  2)  Increase ROM 3-10 degrees to increase functional hand use for ADLs/work/leisure activities. Met 8/1/2022  3)  Decrease edema .1-.5mm to increase joint mobility /flexibility for functional hand use. Met 9/12/2022  4)  Assess  and  3 PT pinch and set goals accordingly. Met 7/27/2022     Long Term Goals (8 weeks)   1)  Increase  strength 2-8 lbs for driving, gym workouts. Met 8/16/2022   2)  Increase 3 PT pinch strength 1-4 psi's for writing, typing and FM activities. Met 9/12/2022   3)  Increase ROM 11-20  degrees to increase functional hand use for ADLs/work/leisure activities. Met 8/16/2022      New Short Term Goals Status = Long Term Goal Status: modified see below   Reasons for Recertification of Therapy: pt continues with functional limitations.      GOALS  1)  Patient to be IND with HEP and modalities for pain management. Met,  9/12/2022  2)  Increase ROM another 3-10 degrees to increase functional hand use for ADLs/work/leisure activities. ------met 9/12/22  3)  Increase  strength another 3-5 lbs for driving, gym workouts. -----met 9/12/22  4)  Increase 3 PT pinch strength another 1-3 psi's for writing, typing and FM activities. -----met 9/12/22     PLAN      Will recommend discharge at this time with all goals met and patient in agreement.      KARMEN Bustos, CHT  11/3/2022

## 2023-02-01 ENCOUNTER — PATIENT MESSAGE (OUTPATIENT)
Dept: ADMINISTRATIVE | Facility: HOSPITAL | Age: 48
End: 2023-02-01
Payer: COMMERCIAL

## 2023-02-01 ENCOUNTER — PATIENT OUTREACH (OUTPATIENT)
Dept: ADMINISTRATIVE | Facility: HOSPITAL | Age: 48
End: 2023-02-01
Payer: COMMERCIAL

## 2023-09-20 ENCOUNTER — DOCUMENTATION ONLY (OUTPATIENT)
Dept: ORTHOPEDICS | Facility: CLINIC | Age: 48
End: 2023-09-20
Payer: COMMERCIAL

## 2023-09-20 NOTE — PROGRESS NOTES
Medical Records request from [facility] sent, with confirmation,  to Medical Records via fax [phone]

## 2023-10-17 ENCOUNTER — OFFICE VISIT (OUTPATIENT)
Dept: URGENT CARE | Facility: CLINIC | Age: 48
End: 2023-10-17
Payer: COMMERCIAL

## 2023-10-17 VITALS
WEIGHT: 156.06 LBS | SYSTOLIC BLOOD PRESSURE: 137 MMHG | HEIGHT: 67 IN | OXYGEN SATURATION: 98 % | TEMPERATURE: 99 F | BODY MASS INDEX: 24.49 KG/M2 | RESPIRATION RATE: 18 BRPM | DIASTOLIC BLOOD PRESSURE: 91 MMHG | HEART RATE: 76 BPM

## 2023-10-17 DIAGNOSIS — J02.9 SORE THROAT: ICD-10-CM

## 2023-10-17 DIAGNOSIS — H61.21 EXCESSIVE CERUMEN IN EAR CANAL, RIGHT: ICD-10-CM

## 2023-10-17 DIAGNOSIS — J06.9 VIRAL URI WITH COUGH: Primary | ICD-10-CM

## 2023-10-17 DIAGNOSIS — R09.81 NASAL CONGESTION: ICD-10-CM

## 2023-10-17 DIAGNOSIS — R52 BODY ACHES: ICD-10-CM

## 2023-10-17 DIAGNOSIS — R49.0 HOARSENESS OF VOICE: ICD-10-CM

## 2023-10-17 DIAGNOSIS — R09.82 POST-NASAL DRIP: ICD-10-CM

## 2023-10-17 PROCEDURE — 99213 OFFICE O/P EST LOW 20 MIN: CPT | Mod: S$GLB,,, | Performed by: NURSE PRACTITIONER

## 2023-10-17 PROCEDURE — 99213 PR OFFICE/OUTPT VISIT, EST, LEVL III, 20-29 MIN: ICD-10-PCS | Mod: S$GLB,,, | Performed by: NURSE PRACTITIONER

## 2023-10-17 RX ORDER — LORATADINE 10 MG/1
10 TABLET ORAL DAILY PRN
Qty: 30 TABLET | Refills: 0 | Status: SHIPPED | OUTPATIENT
Start: 2023-10-17 | End: 2023-10-17

## 2023-10-17 RX ORDER — LORATADINE 10 MG/1
10 TABLET ORAL DAILY PRN
Qty: 30 TABLET | Refills: 0 | Status: SHIPPED | OUTPATIENT
Start: 2023-10-17 | End: 2024-03-06 | Stop reason: SDUPTHER

## 2023-10-17 RX ORDER — FLUTICASONE PROPIONATE 50 MCG
2 SPRAY, SUSPENSION (ML) NASAL DAILY PRN
Qty: 15.8 ML | Refills: 0 | Status: SHIPPED | OUTPATIENT
Start: 2023-10-17 | End: 2023-10-17

## 2023-10-17 RX ORDER — FLUTICASONE PROPIONATE 50 MCG
2 SPRAY, SUSPENSION (ML) NASAL DAILY PRN
Qty: 15.8 ML | Refills: 0 | Status: SHIPPED | OUTPATIENT
Start: 2023-10-17

## 2023-10-17 NOTE — LETTER
October 17, 2023      [facility] - [address] [address]  Phone: [phone]  Fax: [phone]       Patient: Janae Soto   YOB: 1975  Date of Visit: 10/17/2023    To Whom It May Concern:    Dena Soto  was at [facility] on 10/17/2023. The patient may return to work/school on 10/19/2023 with no restrictions. If you have any questions or concerns, or if I can be of further assistance, please do not hesitate to contact me.    Sincerely,     Gabriella Somers NP

## 2023-10-17 NOTE — PROGRESS NOTES
"Subjective:      Patient ID: Janae Soto is a 47 y.o. female.    Vitals:  height is 5' 7" (1.702 m) and weight is 70.8 kg (156 lb 1.4 oz). Her oral temperature is 98.7 °F (37.1 °C). Her blood pressure is 137/91 (abnormal) and her pulse is 76. Her respiration is 18 and oxygen saturation is 98%.     Chief Complaint: Hoarse    Patient presents today with body aches and a loss of voice that started 4 days ago. Patient also has a slight cough that is productive of brown sputum and nasal congestion.   Patient tried nyquil and hot tea and had some relief.     47 year old female presnets to clinic with complaints of body ache, cough, sore throat, nasal congestion x 4 days treating with nyquil, hot tea mild relief. History positive for asthma, Moderna covid vaccine x 2 doses     Sinus Problem  This is a new problem. The current episode started in the past 7 days. The problem is unchanged. There has been no fever. Her pain is at a severity of 6/10. The pain is mild. Associated symptoms include congestion, coughing, a hoarse voice and a sore throat. Pertinent negatives include no chills or diaphoresis. Past treatments include oral decongestants. The treatment provided mild relief.       Constitution: Negative for chills, sweating, fatigue and fever.   HENT:  Positive for congestion, postnasal drip, sore throat and voice change. Negative for trouble swallowing.    Neck: Negative for painful lymph nodes.   Respiratory:  Positive for cough.    Skin:  Negative for erythema.   Hematologic/Lymphatic: Negative for swollen lymph nodes.      Objective:     Physical Exam   Constitutional: She is oriented to person, place, and time. She appears well-developed.   HENT:   Head: Normocephalic and atraumatic. Head is without abrasion, without contusion and without laceration.   Ears:   Right Ear: External ear normal. There is cerumen present.   Left Ear: External ear normal.   Nose: Mucosal edema and rhinorrhea present. Right sinus " exhibits no maxillary sinus tenderness and no frontal sinus tenderness. Left sinus exhibits no maxillary sinus tenderness and no frontal sinus tenderness.   Mouth/Throat: Uvula is midline, oropharynx is clear and moist and mucous membranes are normal.   Eyes: Conjunctivae, EOM and lids are normal. Pupils are equal, round, and reactive to light.   Neck: Trachea normal and phonation normal. Neck supple.   Cardiovascular: Normal rate.   Pulmonary/Chest: Effort normal. No stridor. No respiratory distress.   Musculoskeletal: Normal range of motion.         General: Normal range of motion.   Neurological: She is alert and oriented to person, place, and time.   Skin: Skin is warm, dry, intact and no rash. Capillary refill takes less than 2 seconds. No abrasion, No burn, No bruising, No erythema and No ecchymosis   Psychiatric: Her speech is normal and behavior is normal. Judgment and thought content normal.   Nursing note and vitals reviewed.      Assessment:     1. Viral URI with cough    2. Body aches    3. Nasal congestion    4. Sore throat    5. Post-nasal drip    6. Hoarseness of voice    7. Excessive cerumen in ear canal, right        Plan:       Viral URI with cough    Body aches    Nasal congestion  -     fluticasone propionate (FLONASE) 50 mcg/actuation nasal spray; 2 sprays (100 mcg total) by Each Nostril route daily as needed.  Dispense: 15.8 mL; Refill: 0    Sore throat    Post-nasal drip  -     loratadine (CLARITIN) 10 mg tablet; Take 1 tablet (10 mg total) by mouth daily as needed.  Dispense: 30 tablet; Refill: 0    Hoarseness of voice    Excessive cerumen in ear canal, right        Patient Instructions   Try not to talk. Do not whisper. This can make your problem worse.  Put a cool mist humidifier in your room to keep your throat from being dry.  Drink lots of liquids.  Gargle with warm salt water. Mix 1/2 teaspoon (2.5 grams) salt with a cup (240 mL) of warm water.  Keep your mouth from being dry. Try  chewing gum or sucking on lozenges.  Debrox ear wax removal kit  Please drink plenty of fluids.  Please get plenty of rest.  Please return here or go to the Emergency Department for any concerns or worsening of condition.  It is ok to take over the counter plain Allegra or Claritin or Zyrtec.   If you do have Hypertension or palpitations, it is safe to take Coricidin HBP for relief of sinus symptoms.  We recommend you take Flonase (Fluticasone) or another nasally inhaled steroid unless you are already taking one.  Nasal irrigation with a saline spray or Netti Pot like device per their directions is also recommended.  If not allergic, please take over the counter Tylenol (Acetaminophen) and/or Motrin (Ibuprofen) as directed for control of pain and/or fever.  Please follow up with your primary care doctor or specialist as needed.    If you  smoke, please stop smoking.

## 2023-10-17 NOTE — PATIENT INSTRUCTIONS
Try not to talk. Do not whisper. This can make your problem worse.  Put a cool mist humidifier in your room to keep your throat from being dry.  Drink lots of liquids.  Gargle with warm salt water. Mix 1/2 teaspoon (2.5 grams) salt with a cup (240 mL) of warm water.  Keep your mouth from being dry. Try chewing gum or sucking on lozenges.  Debrox ear wax removal kit  Please drink plenty of fluids.  Please get plenty of rest.  Please return here or go to the Emergency Department for any concerns or worsening of condition.  It is ok to take over the counter plain Allegra or Claritin or Zyrtec.   If you do have Hypertension or palpitations, it is safe to take Coricidin HBP for relief of sinus symptoms.  We recommend you take Flonase (Fluticasone) or another nasally inhaled steroid unless you are already taking one.  Nasal irrigation with a saline spray or Netti Pot like device per their directions is also recommended.  If not allergic, please take over the counter Tylenol (Acetaminophen) and/or Motrin (Ibuprofen) as directed for control of pain and/or fever.  Please follow up with your primary care doctor or specialist as needed.    If you  smoke, please stop smoking.

## 2024-01-10 ENCOUNTER — OFFICE VISIT (OUTPATIENT)
Dept: OBSTETRICS AND GYNECOLOGY | Facility: CLINIC | Age: 49
End: 2024-01-10
Payer: COMMERCIAL

## 2024-01-10 ENCOUNTER — LAB VISIT (OUTPATIENT)
Dept: LAB | Facility: OTHER | Age: 49
End: 2024-01-10
Attending: STUDENT IN AN ORGANIZED HEALTH CARE EDUCATION/TRAINING PROGRAM
Payer: COMMERCIAL

## 2024-01-10 VITALS
BODY MASS INDEX: 27.34 KG/M2 | WEIGHT: 174.19 LBS | HEIGHT: 67 IN | SYSTOLIC BLOOD PRESSURE: 106 MMHG | DIASTOLIC BLOOD PRESSURE: 68 MMHG

## 2024-01-10 DIAGNOSIS — N95.1 PERIMENOPAUSE: ICD-10-CM

## 2024-01-10 DIAGNOSIS — Z01.419 WELL WOMAN EXAM: Primary | ICD-10-CM

## 2024-01-10 LAB
ESTRADIOL SERPL-MCNC: 149 PG/ML
FSH SERPL-ACNC: 5.67 MIU/ML

## 2024-01-10 PROCEDURE — 36415 COLL VENOUS BLD VENIPUNCTURE: CPT | Performed by: STUDENT IN AN ORGANIZED HEALTH CARE EDUCATION/TRAINING PROGRAM

## 2024-01-10 PROCEDURE — 3078F DIAST BP <80 MM HG: CPT | Mod: CPTII,S$GLB,, | Performed by: STUDENT IN AN ORGANIZED HEALTH CARE EDUCATION/TRAINING PROGRAM

## 2024-01-10 PROCEDURE — 88175 CYTOPATH C/V AUTO FLUID REDO: CPT | Performed by: STUDENT IN AN ORGANIZED HEALTH CARE EDUCATION/TRAINING PROGRAM

## 2024-01-10 PROCEDURE — 99396 PREV VISIT EST AGE 40-64: CPT | Mod: S$GLB,,, | Performed by: STUDENT IN AN ORGANIZED HEALTH CARE EDUCATION/TRAINING PROGRAM

## 2024-01-10 PROCEDURE — 87624 HPV HI-RISK TYP POOLED RSLT: CPT | Performed by: STUDENT IN AN ORGANIZED HEALTH CARE EDUCATION/TRAINING PROGRAM

## 2024-01-10 PROCEDURE — 1159F MED LIST DOCD IN RCRD: CPT | Mod: CPTII,S$GLB,, | Performed by: STUDENT IN AN ORGANIZED HEALTH CARE EDUCATION/TRAINING PROGRAM

## 2024-01-10 PROCEDURE — 99999 PR PBB SHADOW E&M-EST. PATIENT-LVL III: CPT | Mod: PBBFAC,,, | Performed by: STUDENT IN AN ORGANIZED HEALTH CARE EDUCATION/TRAINING PROGRAM

## 2024-01-10 PROCEDURE — 82670 ASSAY OF TOTAL ESTRADIOL: CPT | Performed by: STUDENT IN AN ORGANIZED HEALTH CARE EDUCATION/TRAINING PROGRAM

## 2024-01-10 PROCEDURE — 83001 ASSAY OF GONADOTROPIN (FSH): CPT | Performed by: STUDENT IN AN ORGANIZED HEALTH CARE EDUCATION/TRAINING PROGRAM

## 2024-01-10 PROCEDURE — 3008F BODY MASS INDEX DOCD: CPT | Mod: CPTII,S$GLB,, | Performed by: STUDENT IN AN ORGANIZED HEALTH CARE EDUCATION/TRAINING PROGRAM

## 2024-01-10 PROCEDURE — 3074F SYST BP LT 130 MM HG: CPT | Mod: CPTII,S$GLB,, | Performed by: STUDENT IN AN ORGANIZED HEALTH CARE EDUCATION/TRAINING PROGRAM

## 2024-01-11 ENCOUNTER — TELEPHONE (OUTPATIENT)
Dept: OBSTETRICS AND GYNECOLOGY | Facility: CLINIC | Age: 49
End: 2024-01-11
Payer: COMMERCIAL

## 2024-01-11 ENCOUNTER — PATIENT MESSAGE (OUTPATIENT)
Dept: OBSTETRICS AND GYNECOLOGY | Facility: CLINIC | Age: 49
End: 2024-01-11
Payer: COMMERCIAL

## 2024-01-12 ENCOUNTER — TELEPHONE (OUTPATIENT)
Dept: OBSTETRICS AND GYNECOLOGY | Facility: CLINIC | Age: 49
End: 2024-01-12
Payer: COMMERCIAL

## 2024-01-12 ENCOUNTER — PATIENT MESSAGE (OUTPATIENT)
Dept: OBSTETRICS AND GYNECOLOGY | Facility: CLINIC | Age: 49
End: 2024-01-12
Payer: COMMERCIAL

## 2024-01-12 ENCOUNTER — PATIENT MESSAGE (OUTPATIENT)
Dept: ELECTROPHYSIOLOGY | Facility: CLINIC | Age: 49
End: 2024-01-12
Payer: COMMERCIAL

## 2024-01-12 RX ORDER — SERTRALINE HYDROCHLORIDE 50 MG/1
50 TABLET, FILM COATED ORAL DAILY
Qty: 90 TABLET | Refills: 3 | Status: SHIPPED | OUTPATIENT
Start: 2024-01-12 | End: 2024-02-26

## 2024-01-12 NOTE — PROGRESS NOTES
History & Physical  Gynecology      SUBJECTIVE:     Chief Complaint: Well Woman       History of Present Illness:  Janae Soto is a 48 y.o.  who presents for annual physical exam. New to me.  She complains of possible perimenopausal symptoms. Biggest things are anxiety, irritability, feeling overwhelmed. She states she no longer want to go to social functions, and when she is at one she just wants to go home. Thought maybe this was all attributable to post-pandemic lifestyle changes. Noticing that her feet are hot at night, but not really having hot flashes. Cycles getting shorter, bleeding only lasting 2-3 days. Has been off her OCPs for about 2 months and hasn't noticed a difference in how she feels. She did recently start back going to the gym, optimistic that this will help. Has never been on any medications for depression or anxiety.    She is no sexually active, declines STI screening today.  No vaginal discharge, odor, or itching.   She denies urinary incontinence, dysuria, frequency.     She has a remote history of abnormal pap smear with LEEP.  Last pap was  and was NILM.       Review of patient's allergies indicates:  No Known Allergies    Past Medical History:   Diagnosis Date    Asthma      Past Surgical History:   Procedure Laterality Date    SINUS SURGERY       OB History          1    Para   1    Term   1            AB        Living   1         SAB        IAB        Ectopic        Multiple        Live Births   1               Family History   Problem Relation Age of Onset    Asthma Maternal Grandfather     No Known Problems Father     Asthma Mother     Diabetes Paternal Aunt     Cancer Neg Hx     Breast cancer Neg Hx     Colon cancer Neg Hx     Ovarian cancer Neg Hx      Social History     Tobacco Use    Smoking status: Never    Smokeless tobacco: Never   Substance Use Topics    Alcohol use: Yes     Alcohol/week: 5.0 standard drinks of alcohol     Types: 5 Glasses of wine  per week     Comment: socially    Drug use: No       Current Outpatient Medications   Medication Sig    albuterol (PROVENTIL) 2.5 mg /3 mL (0.083 %) nebulizer solution Take 3 mLs (2.5 mg total) by nebulization every 6 (six) hours as needed for Wheezing. Rescue (Patient not taking: Reported on 1/10/2024)    albuterol (PROVENTIL/VENTOLIN HFA) 90 mcg/actuation inhaler Inhale 2 puffs into the lungs every 4 (four) hours as needed for Wheezing. (Patient not taking: Reported on 1/10/2024)    fexofenadine-pseudoephedrine (ALLEGRA-D 24) 180-240 mg per 24 hr tablet Take 1 tablet by mouth once daily.    fluticasone propionate (FLONASE) 50 mcg/actuation nasal spray 2 sprays (100 mcg total) by Each Nostril route daily as needed. (Patient not taking: Reported on 1/10/2024)    loratadine (CLARITIN) 10 mg tablet Take 1 tablet (10 mg total) by mouth daily as needed. (Patient not taking: Reported on 1/10/2024)    norethindrone-ethinyl estradiol (MICROGESTIN 1/20) 1-20 mg-mcg per tablet Take 1 tablet by mouth once daily.    norgestimate-ethinyl estradioL (ORTHO TRI-CYCLEN LO, 28,) 0.18/0.215/0.25 mg-25 mcg tablet Take 1 tablet by mouth once daily.    sertraline (ZOLOFT) 50 MG tablet Take 1 tablet (50 mg total) by mouth once daily.     No current facility-administered medications for this visit.     Facility-Administered Medications Ordered in Other Visits   Medication    sodium chloride 0.9% bolus 1,000 mL         Review of Systems:  Review of Systems   Constitutional:  Negative for fever.   Respiratory:  Negative for shortness of breath.    Cardiovascular:  Negative for chest pain.   Gastrointestinal:  Negative for abdominal pain, nausea and vomiting.   Endocrine: Negative for hot flashes.   Genitourinary:  Negative for menstrual problem and pelvic pain.   Neurological:  Negative for headaches.   Psychiatric/Behavioral:  The patient is nervous/anxious.         OBJECTIVE:     Physical Exam:  Physical Exam  Vitals reviewed. Exam  conducted with a chaperone present.   Constitutional:       General: She is not in acute distress.     Appearance: Normal appearance. She is well-developed. She is not ill-appearing or toxic-appearing.   HENT:      Head: Normocephalic and atraumatic.      Nose: Nose normal.   Eyes:      Extraocular Movements: Extraocular movements intact.      Conjunctiva/sclera: Conjunctivae normal.   Cardiovascular:      Rate and Rhythm: Normal rate.   Pulmonary:      Effort: Pulmonary effort is normal. No respiratory distress.   Chest:   Breasts:     Right: Normal. No swelling, bleeding, inverted nipple, mass, nipple discharge, skin change or tenderness.      Left: Normal. No swelling, bleeding, inverted nipple, mass, nipple discharge, skin change or tenderness.   Abdominal:      Palpations: Abdomen is soft. There is no mass.      Tenderness: There is no abdominal tenderness. There is no guarding or rebound.   Genitourinary:     Vagina: Normal. No vaginal discharge or bleeding.      Cervix: No cervical motion tenderness.      Uterus: Not enlarged and not tender.       Adnexa:         Right: No mass, tenderness or fullness.          Left: No mass, tenderness or fullness.        Comments: External genitalia: Normal  Urethral: Nontender, no masses  Urethral meatus: Normal  Bladder: Non-tender  Musculoskeletal:         General: Normal range of motion.      Cervical back: Normal range of motion.   Skin:     General: Skin is warm and dry.   Neurological:      Mental Status: She is alert. Mental status is at baseline.   Psychiatric:         Mood and Affect: Mood normal.         Behavior: Behavior normal.         Thought Content: Thought content normal.           ASSESSMENT:       ICD-10-CM ICD-9-CM    1. Well woman exam  Z01.419 V72.31 Mammo Digital Screening Bilat w/ Dimitrios      Liquid-Based Pap Smear, Screening      HPV High Risk Genotypes, PCR      2. Perimenopause  N95.1 627.2 FOLLICLE STIMULATING HORMONE      ESTRADIOL              Plan:      -- Pap and HPV today.  -- MMG ordered.  -- Declines STI screen/contraception.  -- Discussed that hormone levels will likely reflect pre- or perimenopause, not postmenopause. Given that her symptoms are largely mood related, would not recommend HT. Discussed SSRI/SNRI, she is interested in this. Will check FSH/E2 and likely start zoloft or prozac. Will follow up with her after labs.      Stephie Ying MD

## 2024-01-12 NOTE — TELEPHONE ENCOUNTER
Called to discuss lab results, consistent with pre-menopause. As discussed in clinic, would not recommend HT for current symptoms, especially with premenopausal hormone levels. Discussed starting SSRI, she is amenable to this. Will try zoloft. Rx sent. Will follow up in about 6 weeks.      Stephie Ying MD

## 2024-01-17 LAB
FINAL PATHOLOGIC DIAGNOSIS: NORMAL
HPV HR 12 DNA SPEC QL NAA+PROBE: NEGATIVE
HPV16 AG SPEC QL: NEGATIVE
HPV18 DNA SPEC QL NAA+PROBE: NEGATIVE
Lab: NORMAL

## 2024-01-26 ENCOUNTER — HOSPITAL ENCOUNTER (OUTPATIENT)
Dept: RADIOLOGY | Facility: OTHER | Age: 49
Discharge: HOME | End: 2024-01-26
Attending: STUDENT IN AN ORGANIZED HEALTH CARE EDUCATION/TRAINING PROGRAM
Payer: COMMERCIAL

## 2024-01-26 DIAGNOSIS — Z01.419 WELL WOMAN EXAM: ICD-10-CM

## 2024-01-26 DIAGNOSIS — Z12.31 VISIT FOR SCREENING MAMMOGRAM: ICD-10-CM

## 2024-01-26 PROCEDURE — 77067 SCR MAMMO BI INCL CAD: CPT | Mod: TC

## 2024-01-26 PROCEDURE — 77063 BREAST TOMOSYNTHESIS BI: CPT | Mod: 26,,, | Performed by: RADIOLOGY

## 2024-01-26 PROCEDURE — 77067 SCR MAMMO BI INCL CAD: CPT | Mod: 26,,, | Performed by: RADIOLOGY

## 2024-02-02 ENCOUNTER — PATIENT MESSAGE (OUTPATIENT)
Dept: ADMINISTRATIVE | Facility: HOSPITAL | Age: 49
End: 2024-02-02
Payer: COMMERCIAL

## 2024-02-02 ENCOUNTER — PATIENT OUTREACH (OUTPATIENT)
Dept: ADMINISTRATIVE | Facility: HOSPITAL | Age: 49
End: 2024-02-02
Payer: COMMERCIAL

## 2024-02-02 NOTE — PROGRESS NOTES
Health Maintenance Due   Topic Date Due    TETANUS VACCINE  Never done    Hemoglobin A1c (Diabetic Prevention Screening)  Never done    Colorectal Cancer Screening  Never done    Influenza Vaccine (1) Never done    COVID-19 Vaccine (3 - 2023-24 season) 09/01/2023   Scheduled with Dr. Zamora 3/6/24 Portal message sent to address over due 's  Health Maintenance reviewed and updated   Immunization triggered  Links updated fford

## 2024-02-26 ENCOUNTER — OFFICE VISIT (OUTPATIENT)
Dept: OBSTETRICS AND GYNECOLOGY | Facility: CLINIC | Age: 49
End: 2024-02-26
Payer: COMMERCIAL

## 2024-02-26 DIAGNOSIS — N95.1 PERIMENOPAUSE: Primary | ICD-10-CM

## 2024-02-26 PROCEDURE — 99213 OFFICE O/P EST LOW 20 MIN: CPT | Mod: 95,,, | Performed by: STUDENT IN AN ORGANIZED HEALTH CARE EDUCATION/TRAINING PROGRAM

## 2024-02-26 RX ORDER — SERTRALINE HYDROCHLORIDE 50 MG/1
100 TABLET, FILM COATED ORAL DAILY
Qty: 180 TABLET | Refills: 3 | Status: SHIPPED | OUTPATIENT
Start: 2024-02-26 | End: 2024-04-03

## 2024-02-26 NOTE — PROGRESS NOTES
Visit type: audiovisual  Total time spent with patient: 5 minutes    Each patient to whom he or she provides medical services by telemedicine is:  (1) informed of the relationship between the physician and patient and the respective role of any other health care provider with respect to management of the patient; and (2) notified that he or she may decline to receive medical services by telemedicine and may withdraw from such care at any time.      Chief Complaint: Follow up     HPI:      Janae Soto is a 48 y.o.  who presents for follow up. Last seen on  for WWE, reported anxiety/irritability/mood changes thought to be associated with perimenopause. No VSM so we started zoloft 50 mg daily. She has been feeling much better overall. Reports that coworkers have pointed out that she seems happier. No side effects. Did have a period last week, first one in about 2 months and it was 5 days long, heavy. She felt like her mood took a setback, it was a bad week. Interested in increasing dose of zoloft to see if it helps.      ROS:     GENERAL: Denies fevers or chills. Feeling well overall.   ABDOMEN: Denies abdominal pain, constipation, diarrhea, nausea, vomiting, change in appetite.   URINARY: Denies frequency, dysuria, hematuria.  GYNECOLOGIC: See HPI.  NEUROLOGIC: Denies syncope or weakness.     Physical Exam:      PHYSICAL EXAM:  There were no vitals taken for this visit.  There is no height or weight on file to calculate BMI.     APPEARANCE: Well nourished, well developed, in no acute distress.  Exam deferred due to televisit        Assessment/Plan:     Perimenopause    Other orders  -     sertraline (ZOLOFT) 50 MG tablet; Take 2 tablets (100 mg total) by mouth once daily.  Dispense: 180 tablet; Refill: 3      -- Zoloft increased to 100 mg daily. She will reach out with any changes or worsening symptoms.    Counseling:       Use of the [employer] Patient Portal discussed and encouraged during today's  visit.       Stephie Ying MD

## 2024-03-06 ENCOUNTER — OFFICE VISIT (OUTPATIENT)
Dept: INTERNAL MEDICINE | Facility: CLINIC | Age: 49
End: 2024-03-06
Payer: COMMERCIAL

## 2024-03-06 ENCOUNTER — LAB VISIT (OUTPATIENT)
Dept: LAB | Facility: OTHER | Age: 49
End: 2024-03-06
Attending: STUDENT IN AN ORGANIZED HEALTH CARE EDUCATION/TRAINING PROGRAM
Payer: COMMERCIAL

## 2024-03-06 VITALS
DIASTOLIC BLOOD PRESSURE: 72 MMHG | WEIGHT: 172.38 LBS | BODY MASS INDEX: 27.06 KG/M2 | SYSTOLIC BLOOD PRESSURE: 116 MMHG | HEIGHT: 67 IN

## 2024-03-06 DIAGNOSIS — Z00.00 ANNUAL PHYSICAL EXAM: ICD-10-CM

## 2024-03-06 DIAGNOSIS — Z12.11 SCREENING FOR COLORECTAL CANCER: Primary | ICD-10-CM

## 2024-03-06 DIAGNOSIS — R09.82 POST-NASAL DRIP: ICD-10-CM

## 2024-03-06 DIAGNOSIS — Z12.12 SCREENING FOR COLORECTAL CANCER: Primary | ICD-10-CM

## 2024-03-06 DIAGNOSIS — Z78.9 ALCOHOL USE: ICD-10-CM

## 2024-03-06 DIAGNOSIS — Z13.1 SCREENING FOR DIABETES MELLITUS: ICD-10-CM

## 2024-03-06 LAB
ALBUMIN SERPL BCP-MCNC: 4.1 G/DL (ref 3.5–5.2)
ALP SERPL-CCNC: 59 U/L (ref 55–135)
ALT SERPL W/O P-5'-P-CCNC: 13 U/L (ref 10–44)
ANION GAP SERPL CALC-SCNC: 7 MMOL/L (ref 8–16)
AST SERPL-CCNC: 18 U/L (ref 10–40)
BASOPHILS # BLD AUTO: 0.04 K/UL (ref 0–0.2)
BASOPHILS NFR BLD: 0.5 % (ref 0–1.9)
BILIRUB SERPL-MCNC: 0.2 MG/DL (ref 0.1–1)
BUN SERPL-MCNC: 17 MG/DL (ref 6–20)
CALCIUM SERPL-MCNC: 9.5 MG/DL (ref 8.7–10.5)
CHLORIDE SERPL-SCNC: 104 MMOL/L (ref 95–110)
CHOLEST SERPL-MCNC: 178 MG/DL (ref 120–199)
CHOLEST/HDLC SERPL: 3.1 {RATIO} (ref 2–5)
CO2 SERPL-SCNC: 28 MMOL/L (ref 23–29)
CREAT SERPL-MCNC: 0.9 MG/DL (ref 0.5–1.4)
DIFFERENTIAL METHOD BLD: ABNORMAL
EOSINOPHIL # BLD AUTO: 0.6 K/UL (ref 0–0.5)
EOSINOPHIL NFR BLD: 8.2 % (ref 0–8)
ERYTHROCYTE [DISTWIDTH] IN BLOOD BY AUTOMATED COUNT: 12.6 % (ref 11.5–14.5)
EST. GFR  (NO RACE VARIABLE): >60 ML/MIN/1.73 M^2
ESTIMATED AVG GLUCOSE: 97 MG/DL (ref 68–131)
GLUCOSE SERPL-MCNC: 84 MG/DL (ref 70–110)
HBA1C MFR BLD: 5 % (ref 4–5.6)
HCT VFR BLD AUTO: 42.6 % (ref 37–48.5)
HDLC SERPL-MCNC: 58 MG/DL (ref 40–75)
HDLC SERPL: 32.6 % (ref 20–50)
HGB BLD-MCNC: 13.6 G/DL (ref 12–16)
IMM GRANULOCYTES # BLD AUTO: 0.02 K/UL (ref 0–0.04)
IMM GRANULOCYTES NFR BLD AUTO: 0.3 % (ref 0–0.5)
LDLC SERPL CALC-MCNC: 87.8 MG/DL (ref 63–159)
LYMPHOCYTES # BLD AUTO: 1.4 K/UL (ref 1–4.8)
LYMPHOCYTES NFR BLD: 18.1 % (ref 18–48)
MCH RBC QN AUTO: 29.9 PG (ref 27–31)
MCHC RBC AUTO-ENTMCNC: 31.9 G/DL (ref 32–36)
MCV RBC AUTO: 94 FL (ref 82–98)
MONOCYTES # BLD AUTO: 1 K/UL (ref 0.3–1)
MONOCYTES NFR BLD: 13.4 % (ref 4–15)
NEUTROPHILS # BLD AUTO: 4.4 K/UL (ref 1.8–7.7)
NEUTROPHILS NFR BLD: 59.5 % (ref 38–73)
NONHDLC SERPL-MCNC: 120 MG/DL
NRBC BLD-RTO: 0 /100 WBC
PLATELET # BLD AUTO: 262 K/UL (ref 150–450)
PMV BLD AUTO: 9.6 FL (ref 9.2–12.9)
POTASSIUM SERPL-SCNC: 4.2 MMOL/L (ref 3.5–5.1)
PROT SERPL-MCNC: 7.2 G/DL (ref 6–8.4)
RBC # BLD AUTO: 4.55 M/UL (ref 4–5.4)
SODIUM SERPL-SCNC: 139 MMOL/L (ref 136–145)
TRIGL SERPL-MCNC: 161 MG/DL (ref 30–150)
WBC # BLD AUTO: 7.46 K/UL (ref 3.9–12.7)

## 2024-03-06 PROCEDURE — 36415 COLL VENOUS BLD VENIPUNCTURE: CPT | Performed by: STUDENT IN AN ORGANIZED HEALTH CARE EDUCATION/TRAINING PROGRAM

## 2024-03-06 PROCEDURE — 99396 PREV VISIT EST AGE 40-64: CPT | Mod: S$GLB,,, | Performed by: STUDENT IN AN ORGANIZED HEALTH CARE EDUCATION/TRAINING PROGRAM

## 2024-03-06 PROCEDURE — 83036 HEMOGLOBIN GLYCOSYLATED A1C: CPT | Performed by: STUDENT IN AN ORGANIZED HEALTH CARE EDUCATION/TRAINING PROGRAM

## 2024-03-06 PROCEDURE — 3008F BODY MASS INDEX DOCD: CPT | Mod: CPTII,S$GLB,, | Performed by: STUDENT IN AN ORGANIZED HEALTH CARE EDUCATION/TRAINING PROGRAM

## 2024-03-06 PROCEDURE — 3044F HG A1C LEVEL LT 7.0%: CPT | Mod: CPTII,S$GLB,, | Performed by: STUDENT IN AN ORGANIZED HEALTH CARE EDUCATION/TRAINING PROGRAM

## 2024-03-06 PROCEDURE — 3074F SYST BP LT 130 MM HG: CPT | Mod: CPTII,S$GLB,, | Performed by: STUDENT IN AN ORGANIZED HEALTH CARE EDUCATION/TRAINING PROGRAM

## 2024-03-06 PROCEDURE — 3078F DIAST BP <80 MM HG: CPT | Mod: CPTII,S$GLB,, | Performed by: STUDENT IN AN ORGANIZED HEALTH CARE EDUCATION/TRAINING PROGRAM

## 2024-03-06 PROCEDURE — 99999 PR PBB SHADOW E&M-EST. PATIENT-LVL III: CPT | Mod: PBBFAC,,, | Performed by: STUDENT IN AN ORGANIZED HEALTH CARE EDUCATION/TRAINING PROGRAM

## 2024-03-06 PROCEDURE — 80053 COMPREHEN METABOLIC PANEL: CPT | Performed by: STUDENT IN AN ORGANIZED HEALTH CARE EDUCATION/TRAINING PROGRAM

## 2024-03-06 PROCEDURE — 80061 LIPID PANEL: CPT | Performed by: STUDENT IN AN ORGANIZED HEALTH CARE EDUCATION/TRAINING PROGRAM

## 2024-03-06 PROCEDURE — 85025 COMPLETE CBC W/AUTO DIFF WBC: CPT | Performed by: STUDENT IN AN ORGANIZED HEALTH CARE EDUCATION/TRAINING PROGRAM

## 2024-03-06 PROCEDURE — 1159F MED LIST DOCD IN RCRD: CPT | Mod: CPTII,S$GLB,, | Performed by: STUDENT IN AN ORGANIZED HEALTH CARE EDUCATION/TRAINING PROGRAM

## 2024-03-06 RX ORDER — LORATADINE 10 MG/1
10 TABLET ORAL DAILY PRN
Qty: 30 TABLET | Refills: 0 | Status: SHIPPED | OUTPATIENT
Start: 2024-03-06 | End: 2024-04-02 | Stop reason: SDUPTHER

## 2024-03-06 NOTE — PROGRESS NOTES
[facility]    Subjective:         Patient ID: Janae Soto is a 48 y.o. female.    Chief Complaint: Annual Exam     History was obtained from the patient and supplemented through chart review.    HPI:  Patient is a 48 y.o. female who presents for annual exam    Takes Zolof and Allegra-D for allergies.   States 10mg claritin worked very well in the past  She takes 25 mg of doxylamine nightly as a sleep aid.  Does not feel groggy in the morning    Works as . English and social studies.  Lives alone with 4 dogs. Son lives in [place]. Rest of Family is in the [place] area.     Never smoked  Etoh: glass of [place] nightly     Exercises regularly by doing [employer] classes.   She is amenable to Cologuard for colorectal cancer screening      Medical History  Past Medical History:   Diagnosis Date    Asthma          Surgical hx, family hx, social hx   Family History   Problem Relation Age of Onset    Asthma Maternal Grandfather     No Known Problems Father     Asthma Mother     Diabetes Paternal Aunt     Cancer Neg Hx     Breast cancer Neg Hx     Colon cancer Neg Hx     Ovarian cancer Neg Hx      Past Surgical History:   Procedure Laterality Date    SINUS SURGERY       Social History     Socioeconomic History    Marital status: Single   Tobacco Use    Smoking status: Never    Smokeless tobacco: Never   Substance and Sexual Activity    Alcohol use: Yes     Alcohol/week: 5.0 standard drinks of alcohol     Types: 5 Glasses of wine per week     Comment: socially    Drug use: No    Sexual activity: Not Currently     Partners: Male     Birth control/protection: None       Immunization History   Administered Date(s) Administered    COVID-19 MRNA, LN-S PF (MODERNA HALF 0.25 ML DOSE) 12/10/2021    COVID-19, MRNA, LN-S, PF (MODERNA FULL 0.5 ML DOSE) 02/11/2021, 03/17/2021       Current Outpatient Medications   Medication Instructions    albuterol (PROVENTIL)  "2.5 mg, Nebulization, Every 6 hours PRN, Rescue    albuterol (PROVENTIL/VENTOLIN HFA) 90 mcg/actuation inhaler 2 puffs, Inhalation, Every 4 hours PRN    fexofenadine-pseudoephedrine (ALLEGRA-D 24) 180-240 mg per 24 hr tablet 1 tablet, Oral, Daily    fluticasone propionate (FLONASE) 100 mcg, Each Nostril, Daily PRN    loratadine (CLARITIN) 10 mg, Oral, Daily PRN    norethindrone-ethinyl estradiol (MICROGESTIN 1/20) 1-20 mg-mcg per tablet 1 tablet, Oral, Daily    norgestimate-ethinyl estradioL (ORTHO TRI-CYCLEN LO, 28,) 0.18/0.215/0.25 mg-25 mcg tablet 1 tablet, Oral, Daily    sertraline (ZOLOFT) 100 mg, Oral, Daily         Objective:        Body mass index is 27 kg/m².  Vitals:    03/06/24 1457   BP: 116/72   Weight: 78.2 kg (172 lb 6.4 oz)   Height: 5' 7" (1.702 m)   PainSc: 0-No pain     Physical Exam  Constitutional:       General: She is not in acute distress.  HENT:      Head: Normocephalic.      Nose: Nose normal.      Mouth/Throat:      Mouth: Mucous membranes are moist.      Pharynx: No oropharyngeal exudate.   Eyes:      Extraocular Movements: Extraocular movements intact.      Pupils: Pupils are equal, round, and reactive to light.   Neck:      Thyroid: No thyromegaly or thyroid tenderness.   Cardiovascular:      Rate and Rhythm: Normal rate and regular rhythm.      Pulses: Normal pulses.      Heart sounds: No murmur heard.  Pulmonary:      Effort: Pulmonary effort is normal. No respiratory distress.      Breath sounds: No wheezing or rales.   Abdominal:      General: Abdomen is flat. Bowel sounds are normal.      Palpations: Abdomen is soft.      Tenderness: There is no abdominal tenderness.   Musculoskeletal:      Right lower leg: No edema.      Left lower leg: No edema.   Lymphadenopathy:      Cervical: No cervical adenopathy.   Skin:     General: Skin is warm and dry.      Findings: No rash.   Neurological:      General: No focal deficit present.      Mental Status: She is alert.   Psychiatric:         " Mood and Affect: Mood normal.         Behavior: Behavior normal.           Lab Results   Component Value Date    WBC 7.46 03/06/2024    HGB 13.6 03/06/2024    HCT 42.6 03/06/2024     03/06/2024    CHOL 178 03/06/2024    TRIG 161 (H) 03/06/2024    HDL 58 03/06/2024    ALT 13 03/06/2024    AST 18 03/06/2024     03/06/2024    K 4.2 03/06/2024     03/06/2024    CREATININE 0.9 03/06/2024    BUN 17 03/06/2024    CO2 28 03/06/2024    TSH 0.656 08/02/2021    INR 1.0 06/07/2021    HGBA1C 5.0 03/06/2024       The 10-year ASCVD risk score (Todd KISER, et al., 2019) is: 0.7%    Values used to calculate the score:      Age: 48 years      Sex: Female      Is Non- : No      Diabetic: No      Tobacco smoker: No      Systolic Blood Pressure: 116 mmHg      Is BP treated: No      HDL Cholesterol: 58 mg/dL      Total Cholesterol: 178 mg/dL    Assessment:         1. Screening for colorectal cancer    2. Post-nasal drip    3. Annual physical exam    4. Alcohol use    5. Screening for diabetes mellitus          Plan:     Doing well today obtain basic lab to screen for diabetes and hyperlipidemia.  These were unremarkable.  Advised decrease alcohol use to recommended maximum amount of about 4 drinks per week on average.  Advised her to avoid alcohol prior to bedtime to reduce interference with sleep.  Additional orders as below    Screening for colorectal cancer  -     Cologuard Screening (Multitarget Stool DNA); Future; Expected date: 03/06/2024    Post-nasal drip  -     loratadine (CLARITIN) 10 mg tablet; Take 1 tablet (10 mg total) by mouth daily as needed for Allergies.  Dispense: 30 tablet; Refill: 0    Annual physical exam  -     Hemoglobin A1C; Future; Expected date: 03/06/2024  -     LIPID PANEL; Future; Expected date: 03/06/2024    Alcohol use  -     CBC W/ AUTO DIFFERENTIAL; Future; Expected date: 03/06/2024  -     COMPREHENSIVE METABOLIC PANEL; Future; Expected date:  03/06/2024    Screening for diabetes mellitus  -     Hemoglobin A1C; Future; Expected date: 03/06/2024      Health Maintenance  Advised she obtain updated flu, COVID, and Tdap from her pharmacy as she deferred today.    Tests to Keep You Healthy    Mammogram: Met on 1/26/2024  Colon Cancer Screening: ORDERED  Cervical Cancer Screening: Met on 1/10/2024    Follow up in about 1 year (around 3/6/2025). or sooner prn        Wolfgang Darnell Bank [facility]  [facility] [address] [address]  Phone [phone]  Fax [phone]    This note is dictated using the M*Modal Fluency Direct word recognition program. It may contain word recognition mistakes or wrong word substitutions that were missed on review.

## 2024-03-11 ENCOUNTER — OFFICE VISIT (OUTPATIENT)
Dept: URGENT CARE | Facility: CLINIC | Age: 49
End: 2024-03-11
Payer: COMMERCIAL

## 2024-03-11 VITALS
WEIGHT: 155 LBS | SYSTOLIC BLOOD PRESSURE: 119 MMHG | TEMPERATURE: 98 F | HEART RATE: 82 BPM | DIASTOLIC BLOOD PRESSURE: 87 MMHG | OXYGEN SATURATION: 99 % | BODY MASS INDEX: 24.33 KG/M2 | HEIGHT: 67 IN | RESPIRATION RATE: 15 BRPM

## 2024-03-11 DIAGNOSIS — R19.7 DIARRHEA OF PRESUMED INFECTIOUS ORIGIN: Primary | ICD-10-CM

## 2024-03-11 PROCEDURE — 87046 STOOL CULTR AEROBIC BACT EA: CPT | Performed by: FAMILY MEDICINE

## 2024-03-11 PROCEDURE — 87449 NOS EACH ORGANISM AG IA: CPT | Mod: 91 | Performed by: FAMILY MEDICINE

## 2024-03-11 PROCEDURE — 87427 SHIGA-LIKE TOXIN AG IA: CPT | Performed by: FAMILY MEDICINE

## 2024-03-11 PROCEDURE — 87045 FECES CULTURE AEROBIC BACT: CPT | Performed by: FAMILY MEDICINE

## 2024-03-11 PROCEDURE — 87449 NOS EACH ORGANISM AG IA: CPT | Performed by: FAMILY MEDICINE

## 2024-03-11 PROCEDURE — 99214 OFFICE O/P EST MOD 30 MIN: CPT | Mod: S$GLB,,, | Performed by: FAMILY MEDICINE

## 2024-03-11 RX ORDER — HYOSCYAMINE SULFATE 0.125 MG
125 TABLET ORAL EVERY 4 HOURS PRN
Qty: 20 TABLET | Refills: 0 | Status: SHIPPED | OUTPATIENT
Start: 2024-03-11

## 2024-03-11 RX ORDER — LOPERAMIDE HYDROCHLORIDE 2 MG/1
2 CAPSULE ORAL 4 TIMES DAILY PRN
Qty: 20 CAPSULE | Refills: 0 | Status: SHIPPED | OUTPATIENT
Start: 2024-03-11 | End: 2024-03-21

## 2024-03-11 NOTE — PROGRESS NOTES
"Subjective:      Patient ID: Janae Soto is a 48 y.o. female.    Vitals:  height is 5' 7" (1.702 m) and weight is 70.3 kg (155 lb). Her temperature is 98.2 °F (36.8 °C). Her blood pressure is 119/87 and her pulse is 82. Her respiration is 15 and oxygen saturation is 99%.     Chief Complaint: Diarrhea    Patients presents with diarrhea that started on Wednesday. She also has some nasal congestion and a mild cough. Denies fever or blood in the stool. She is a  and around children that are ill frequently. She is able to keep fluids down and some light foods(oswaldo diet) but eating aggravates abdominal cramping.   She did a home covid test last week. Pt denies any recent antibiotic use     Diarrhea   This is a new problem. Episode onset: 5 days. The problem occurs more than 10 times per day. The problem has been rapidly worsening. The stool consistency is described as Watery. Associated symptoms include coughing. Pertinent negatives include no fever. She has tried nothing for the symptoms.       Constitution: Negative for fever.   Respiratory:  Positive for cough.    Gastrointestinal:  Positive for diarrhea.      Objective:     Physical Exam   Constitutional: She appears ill (mildly ill appearing).   HENT:   Head: Normocephalic and atraumatic.   Nose: Congestion (slight) present.   Mouth/Throat: Mucous membranes are dry. Oropharynx is clear.   Cardiovascular: Normal rate and regular rhythm.   Pulmonary/Chest: Effort normal and breath sounds normal.   Abdominal: flat abdomen   Lymphadenopathy:     She has no cervical adenopathy.   Neurological: She is alert.   Nursing note and vitals reviewed.      Assessment:     1. Diarrhea of presumed infectious origin        Plan:       Diarrhea of presumed infectious origin  -     CULTURE, STOOL  -     CLOSTRIDIUM DIFFICILE  -     loperamide (IMODIUM) 2 mg capsule; Take 1 capsule (2 mg total) by mouth 4 (four) times daily as needed for Diarrhea.  Dispense: 20 " capsule; Refill: 0  -     hyoscyamine (ANASPAZ,LEVSIN) 0.125 mg Tab; Take 1 tablet (125 mcg total) by mouth every 4 (four) hours as needed (abdominal cramps).  Dispense: 20 tablet; Refill: 0    Other orders  -     E. coli 0157 antigen    Pt or guardian provided educational materials and instructions regarding their visit diagnosis.

## 2024-03-12 LAB
C DIFF GDH STL QL: NEGATIVE
C DIFF TOX A+B STL QL IA: NEGATIVE
E COLI SXT1 STL QL IA: NEGATIVE
E COLI SXT2 STL QL IA: NEGATIVE

## 2024-03-14 LAB — BACTERIA STL CULT: NORMAL

## 2024-03-15 ENCOUNTER — TELEPHONE (OUTPATIENT)
Dept: URGENT CARE | Facility: CLINIC | Age: 49
End: 2024-03-15
Payer: COMMERCIAL

## 2024-03-15 NOTE — TELEPHONE ENCOUNTER
No answer. Left message on answering machine for pt to call back for test results. Her results were all normal. Hopefully she is feeling better. Pt has seen results on portal. Left a message for her to call or return if she has questions or if she is not doing better.

## 2024-04-02 ENCOUNTER — PATIENT MESSAGE (OUTPATIENT)
Dept: OBSTETRICS AND GYNECOLOGY | Facility: CLINIC | Age: 49
End: 2024-04-02
Payer: COMMERCIAL

## 2024-04-02 DIAGNOSIS — R09.82 POST-NASAL DRIP: ICD-10-CM

## 2024-04-02 NOTE — TELEPHONE ENCOUNTER
No care due was identified.  [facility] Embedded Care Due Messages. Reference number: [id number].   4/02/2024 8:58:27 AM CDT

## 2024-04-03 RX ORDER — SERTRALINE HYDROCHLORIDE 100 MG/1
100 TABLET, FILM COATED ORAL DAILY
Qty: 90 TABLET | Refills: 3 | Status: SHIPPED | OUTPATIENT
Start: 2024-04-03 | End: 2025-04-03

## 2024-04-03 RX ORDER — LORATADINE 10 MG/1
10 TABLET ORAL DAILY PRN
Qty: 30 TABLET | Refills: 1 | Status: SHIPPED | OUTPATIENT
Start: 2024-04-03

## 2024-04-03 NOTE — TELEPHONE ENCOUNTER
Refill Routing Note   Medication(s) are not appropriate for processing by [facility] for the following reason(s):        New or recently adjusted medication    [facility] action(s):  Defer             Appointments  past 12m or future 3m with PCP    Date Provider   Last Visit   3/6/2024 Wolfgang Antonio MD   Next Visit   Visit date not found Wolfgang Antonio MD   ED visits in past 90 days: 0        Note composed:11:51 AM 04/03/2024

## 2024-04-10 LAB — NONINV COLON CA DNA+OCC BLD SCRN STL QL: NEGATIVE

## 2024-04-22 ENCOUNTER — PATIENT MESSAGE (OUTPATIENT)
Dept: OBSTETRICS AND GYNECOLOGY | Facility: CLINIC | Age: 49
End: 2024-04-22
Payer: COMMERCIAL

## 2024-11-13 ENCOUNTER — OFFICE VISIT (OUTPATIENT)
Dept: URGENT CARE | Facility: CLINIC | Age: 49
End: 2024-11-13
Payer: COMMERCIAL

## 2024-11-13 VITALS
BODY MASS INDEX: 24.33 KG/M2 | DIASTOLIC BLOOD PRESSURE: 86 MMHG | OXYGEN SATURATION: 100 % | TEMPERATURE: 98 F | SYSTOLIC BLOOD PRESSURE: 128 MMHG | HEART RATE: 105 BPM | HEIGHT: 67 IN | WEIGHT: 155 LBS | RESPIRATION RATE: 19 BRPM

## 2024-11-13 DIAGNOSIS — J04.0 LARYNGITIS: ICD-10-CM

## 2024-11-13 DIAGNOSIS — J01.90 ACUTE NON-RECURRENT SINUSITIS, UNSPECIFIED LOCATION: Primary | ICD-10-CM

## 2024-11-13 DIAGNOSIS — J02.9 SORE THROAT: ICD-10-CM

## 2024-11-13 DIAGNOSIS — J20.9 ACUTE BRONCHITIS DUE TO INFECTION: ICD-10-CM

## 2024-11-13 DIAGNOSIS — R05.9 COUGH, UNSPECIFIED TYPE: ICD-10-CM

## 2024-11-13 PROCEDURE — 99214 OFFICE O/P EST MOD 30 MIN: CPT | Mod: 25,S$GLB,, | Performed by: FAMILY MEDICINE

## 2024-11-13 PROCEDURE — 96372 THER/PROPH/DIAG INJ SC/IM: CPT | Mod: S$GLB,,, | Performed by: FAMILY MEDICINE

## 2024-11-13 RX ORDER — AMOXICILLIN AND CLAVULANATE POTASSIUM 875; 125 MG/1; MG/1
1 TABLET, FILM COATED ORAL 2 TIMES DAILY
Qty: 14 TABLET | Refills: 0 | Status: SHIPPED | OUTPATIENT
Start: 2024-11-13 | End: 2024-11-20

## 2024-11-13 RX ORDER — PROMETHAZINE HYDROCHLORIDE AND DEXTROMETHORPHAN HYDROBROMIDE 6.25; 15 MG/5ML; MG/5ML
5 SYRUP ORAL 3 TIMES DAILY PRN
Qty: 120 ML | Refills: 0 | Status: SHIPPED | OUTPATIENT
Start: 2024-11-13 | End: 2024-11-23

## 2024-11-13 RX ORDER — BETAMETHASONE SODIUM PHOSPHATE AND BETAMETHASONE ACETATE 3; 3 MG/ML; MG/ML
6 INJECTION, SUSPENSION INTRA-ARTICULAR; INTRALESIONAL; INTRAMUSCULAR; SOFT TISSUE
Status: COMPLETED | OUTPATIENT
Start: 2024-11-13 | End: 2024-11-13

## 2024-11-13 RX ADMIN — BETAMETHASONE SODIUM PHOSPHATE AND BETAMETHASONE ACETATE 6 MG: 3; 3 INJECTION, SUSPENSION INTRA-ARTICULAR; INTRALESIONAL; INTRAMUSCULAR; SOFT TISSUE at 11:11

## 2024-11-13 NOTE — PROGRESS NOTES
"Subjective:      Patient ID: Janae Soto is a 48 y.o. female.    Vitals:  height is 5' 7" (1.702 m) and weight is 70.3 kg (155 lb). Her oral temperature is 98.2 °F (36.8 °C). Her blood pressure is 128/86 and her pulse is 105. Her respiration is 19 and oxygen saturation is 100%.     Chief Complaint: Sore Throat    Patient presents with sore throat, loss of voice, nasal congestion, acute onset of cough. Onset overall 2 weeks, getting worse over last few days with greenish nasal discharge. Otc meds and mucinex.  Patient denies fever, chills, chest pain, SOB, nausea/vomiting.      Sore Throat   This is a new problem. Episode onset: -2 weeks. There has been no fever.       HENT:  Positive for sore throat.       Objective:     Physical Exam   Constitutional: She is oriented to person, place, and time. She appears well-developed. She is cooperative.  Non-toxic appearance. She does not appear ill. No distress.   HENT:   Head: Normocephalic and atraumatic.   Ears:   Right Ear: Hearing, tympanic membrane, external ear and ear canal normal. no impacted cerumen  Left Ear: Hearing, tympanic membrane, external ear and ear canal normal. no impacted cerumen  Nose: Congestion present. No mucosal edema, rhinorrhea or nasal deformity. No epistaxis. Right sinus exhibits no maxillary sinus tenderness and no frontal sinus tenderness. Left sinus exhibits no maxillary sinus tenderness and no frontal sinus tenderness.   Mouth/Throat: Uvula is midline, oropharynx is clear and moist and mucous membranes are normal. No trismus in the jaw. Normal dentition. No uvula swelling. No oropharyngeal exudate, posterior oropharyngeal edema or posterior oropharyngeal erythema.   Eyes: Conjunctivae and lids are normal. No scleral icterus.   Neck: Trachea normal and phonation normal. Neck supple. No edema present. No erythema present. No neck rigidity present.   Cardiovascular: Normal rate, regular rhythm, normal heart sounds and normal pulses.   No " murmur heard.  Pulmonary/Chest: Effort normal and breath sounds normal. No stridor. No respiratory distress. She has no decreased breath sounds. She has no wheezes. She has no rhonchi. She has no rales.   Abdominal: Normal appearance. She exhibits no distension. There is no abdominal tenderness. There is no left CVA tenderness and no right CVA tenderness.   Musculoskeletal: Normal range of motion.         General: No deformity. Normal range of motion.      Cervical back: She exhibits no tenderness.   Lymphadenopathy:     She has no cervical adenopathy.   Neurological: She is alert, oriented to person, place, and time and at baseline. She exhibits normal muscle tone. Coordination normal.   Skin: Skin is warm, dry, intact, not diaphoretic and not pale.   Psychiatric: Her speech is normal and behavior is normal. Judgment and thought content normal.   Nursing note and vitals reviewed.      Assessment:     1. Acute non-recurrent sinusitis, unspecified location    2. Sore throat    3. Acute bronchitis due to infection    4. Laryngitis    5. Cough, unspecified type        Plan:   Discussed exam findings/results/diagnosis/plan with patient. Advised to f/u with PCP within 2-5 days. ER precautions given if symptoms get any worse. All questions answered. Patient verbally understood and agreed with treatment plan.  Educational materials and instructions regarding the visit diagnosis and management provided.     Acute non-recurrent sinusitis, unspecified location    Sore throat    Acute bronchitis due to infection    Laryngitis    Cough, unspecified type    Other orders  -     betamethasone acetate-betamethasone sodium phosphate injection 6 mg  -     promethazine-dextromethorphan (PROMETHAZINE-DM) 6.25-15 mg/5 mL Syrp; Take 5 mLs by mouth 3 (three) times daily as needed (Cough).  Dispense: 120 mL; Refill: 0  -     amoxicillin-clavulanate 875-125mg (AUGMENTIN) 875-125 mg per tablet; Take 1 tablet by mouth 2 (two) times daily. for  7 days  Dispense: 14 tablet; Refill: 0

## 2025-02-12 DIAGNOSIS — Z12.31 OTHER SCREENING MAMMOGRAM: ICD-10-CM

## 2025-04-05 ENCOUNTER — OFFICE VISIT (OUTPATIENT)
Dept: URGENT CARE | Facility: CLINIC | Age: 50
End: 2025-04-05
Payer: COMMERCIAL

## 2025-04-05 VITALS
HEIGHT: 67 IN | OXYGEN SATURATION: 99 % | HEART RATE: 82 BPM | DIASTOLIC BLOOD PRESSURE: 85 MMHG | TEMPERATURE: 98 F | RESPIRATION RATE: 20 BRPM | BODY MASS INDEX: 24.33 KG/M2 | WEIGHT: 155 LBS | SYSTOLIC BLOOD PRESSURE: 138 MMHG

## 2025-04-05 DIAGNOSIS — H10.89 OTHER CONJUNCTIVITIS OF RIGHT EYE: ICD-10-CM

## 2025-04-05 DIAGNOSIS — R09.82 POSTNASAL DRIP: ICD-10-CM

## 2025-04-05 DIAGNOSIS — J01.90 ACUTE NON-RECURRENT SINUSITIS, UNSPECIFIED LOCATION: Primary | ICD-10-CM

## 2025-04-05 DIAGNOSIS — I10 HYPERTENSION, UNSPECIFIED TYPE: ICD-10-CM

## 2025-04-05 PROCEDURE — 96372 THER/PROPH/DIAG INJ SC/IM: CPT | Mod: S$GLB,,, | Performed by: FAMILY MEDICINE

## 2025-04-05 PROCEDURE — 99214 OFFICE O/P EST MOD 30 MIN: CPT | Mod: 25,S$GLB,, | Performed by: FAMILY MEDICINE

## 2025-04-05 RX ORDER — BETAMETHASONE SODIUM PHOSPHATE AND BETAMETHASONE ACETATE 3; 3 MG/ML; MG/ML
6 INJECTION, SUSPENSION INTRA-ARTICULAR; INTRALESIONAL; INTRAMUSCULAR; SOFT TISSUE
Status: COMPLETED | OUTPATIENT
Start: 2025-04-05 | End: 2025-04-05

## 2025-04-05 RX ORDER — OFLOXACIN 3 MG/ML
1 SOLUTION/ DROPS OPHTHALMIC EVERY 4 HOURS
Qty: 5 ML | Refills: 0 | Status: SHIPPED | OUTPATIENT
Start: 2025-04-05

## 2025-04-05 RX ADMIN — BETAMETHASONE SODIUM PHOSPHATE AND BETAMETHASONE ACETATE 6 MG: 3; 3 INJECTION, SUSPENSION INTRA-ARTICULAR; INTRALESIONAL; INTRAMUSCULAR; SOFT TISSUE at 02:04

## 2025-04-05 NOTE — PROGRESS NOTES
"Subjective:      Patient ID: Janae Soto is a 49 y.o. female.    Vitals:  height is 5' 7" (1.702 m) and weight is 70.3 kg (155 lb). Her temperature is 98.3 °F (36.8 °C). Her blood pressure is 138/85 and her pulse is 82. Her respiration is 20 and oxygen saturation is 99%.     Chief Complaint: Sinus Problem    Pt presents with complaint of sinus congestion, headache, sneezing x1 week.  Pt states she has hx of allergies and states she has been having a reaction to pollen in the air.  Pt states she has taken benadryl and allegra for her symptoms with no relief.  Patient denies fever, chills, diarrhea, vomiting, abdominal pain, dysuria, weakness.  Blood pressure is found to be mildly elevated.  Patient denies history of hypertension.  Denies headache, chest pain, SOB.    Sinus Problem  This is a new problem. The current episode started in the past 7 days. The problem is unchanged. There has been no fever. Her pain is at a severity of 3/10. The pain is mild. Treatments tried: allegra, benadryl.     ROS   Objective:     Physical Exam   Constitutional: She is oriented to person, place, and time. She appears well-developed. She is cooperative.  Non-toxic appearance. She does not appear ill. No distress.   HENT:   Head: Normocephalic and atraumatic.   Ears:   Right Ear: Hearing, tympanic membrane, external ear and ear canal normal. no impacted cerumen  Left Ear: Hearing, tympanic membrane, external ear and ear canal normal. no impacted cerumen  Nose: Congestion present. No mucosal edema, rhinorrhea or nasal deformity. No epistaxis. Right sinus exhibits no maxillary sinus tenderness and no frontal sinus tenderness. Left sinus exhibits no maxillary sinus tenderness and no frontal sinus tenderness.   Mouth/Throat: Uvula is midline, oropharynx is clear and moist and mucous membranes are normal. No trismus in the jaw. Normal dentition. No uvula swelling. No oropharyngeal exudate, posterior oropharyngeal edema or posterior " oropharyngeal erythema.   Eyes: Conjunctivae and lids are normal. No scleral icterus.   Neck: Trachea normal and phonation normal. Neck supple. No edema present. No erythema present. No neck rigidity present.   Cardiovascular: Normal rate, regular rhythm, normal heart sounds and normal pulses.   No murmur heard.  Pulmonary/Chest: Effort normal and breath sounds normal. No stridor. No respiratory distress. She has no decreased breath sounds. She has no wheezes. She has no rhonchi. She has no rales.   Abdominal: Normal appearance. She exhibits no distension. There is no abdominal tenderness.   Musculoskeletal: Normal range of motion.         General: No deformity. Normal range of motion.      Cervical back: She exhibits no tenderness.   Lymphadenopathy:     She has no cervical adenopathy.   Neurological: She is alert, oriented to person, place, and time and at baseline. She exhibits normal muscle tone. Coordination normal.   Skin: Skin is warm, dry, intact, not diaphoretic and not pale.   Psychiatric: Her speech is normal and behavior is normal. Judgment and thought content normal.   Nursing note and vitals reviewed.      Assessment:     1. Acute non-recurrent sinusitis, unspecified location    2. Hypertension, unspecified type    3. Postnasal drip    4. Other conjunctivitis of right eye        Plan:   Discussed exam findings/results/diagnosis/plan with patient. Advised to f/u with PCP within 2-5 days. ER precautions given if symptoms get any worse. All questions answered. Patient verbally understood and agreed with treatment plan.  Educational materials and instructions regarding the visit diagnosis and management provided.     Acute non-recurrent sinusitis, unspecified location    Hypertension, unspecified type    Postnasal drip    Other conjunctivitis of right eye    Other orders  -     betamethasone acetate-betamethasone sodium phosphate injection 6 mg  -     ofloxacin (OCUFLOX) 0.3 % ophthalmic solution; Place 1  drop into the right eye every 4 (four) hours.  Dispense: 5 mL; Refill: 0

## (undated) DEVICE — LINE PRESSURE MONITORING 96IN

## (undated) DEVICE — INTRO FAST-CATH SL1 8.5FR 63CM

## (undated) DEVICE — CATH BIDIRECTIONAL DF CRV 7FR

## (undated) DEVICE — INTRO AGILIS MED CRL 8.5F 71CM

## (undated) DEVICE — KIT ENSITE ELECTRODE SURFACE

## (undated) DEVICE — COVER DRAPE ACUSON STERILE

## (undated) DEVICE — SHEATH HEMOSTASIS 8.5FR

## (undated) DEVICE — SET TUBING COOL POINT IRR

## (undated) DEVICE — CATH RESPONSE QPLR JSN 6F 120

## (undated) DEVICE — INTRODUCER HEMOSTASIS 7.5F

## (undated) DEVICE — PAD GROUND UNIV STYLE CORD 9IN

## (undated) DEVICE — PAD DEFIB CADENCE ADULT R2

## (undated) DEVICE — PACK EP DRAPE

## (undated) DEVICE — CATH SUPREME QPLR CRD-2 6F 120

## (undated) DEVICE — CATH TACTICATH ABLAT BIDIR D-F

## (undated) DEVICE — KIT PROBE COVER WITH GEL

## (undated) DEVICE — REPROCESSED CATH ACUNAV 8FR

## (undated) DEVICE — NDL TRNSSPTL BRK-1 18GA 71CM